# Patient Record
Sex: MALE | Race: WHITE | NOT HISPANIC OR LATINO | Employment: UNEMPLOYED | URBAN - METROPOLITAN AREA
[De-identification: names, ages, dates, MRNs, and addresses within clinical notes are randomized per-mention and may not be internally consistent; named-entity substitution may affect disease eponyms.]

---

## 2019-01-02 ENCOUNTER — TELEPHONE (OUTPATIENT)
Dept: GASTROENTEROLOGY | Facility: CLINIC | Age: 54
End: 2019-01-02

## 2019-01-02 NOTE — TELEPHONE ENCOUNTER
SPOKE WITH WIFE.  PATIENT HAS POSITIVE COLOGUARD.  TRYING TO FIND DOCTOR THAT IS PAR WITH HER INSURANCE NELLIE MANAGED CHOICE.  SHE GOING TO CALL HIS PCP FOR REFERRAL AND TO FAX OVER THE COLOGUARD TEST.  GAVE HER THE FAX# AND DIRECT LINE TO ME DESK.  SHE DID FILL PUT PAPER WORK FROM DR. ARMSTRONG OFFICE, BUT HE IS NOT PAR WITH HER INSURANCE.  EXPLAINED ONCE RECEIVED COLOGUARD RESULTS, WILL FORWARD TO DR. COVINGTON FOR REVIEW.

## 2019-02-21 ENCOUNTER — ON CAMPUS - OUTPATIENT (OUTPATIENT)
Dept: URBAN - METROPOLITAN AREA HOSPITAL 108 | Facility: HOSPITAL | Age: 54
End: 2019-02-21
Payer: COMMERCIAL

## 2019-02-21 DIAGNOSIS — R19.5 OTHER FECAL ABNORMALITIES: ICD-10-CM

## 2019-02-21 DIAGNOSIS — K92.2 GASTROINTESTINAL HEMORRHAGE, UNSPECIFIED: ICD-10-CM

## 2019-02-21 DIAGNOSIS — K63.3 ULCER OF INTESTINE: ICD-10-CM

## 2019-02-21 DIAGNOSIS — C18.7 MALIGNANT NEOPLASM OF SIGMOID COLON: ICD-10-CM

## 2019-02-21 DIAGNOSIS — K57.30 DIVERTICULOSIS OF LARGE INTESTINE WITHOUT PERFORATION OR ABS: ICD-10-CM

## 2019-02-21 DIAGNOSIS — Z12.11 ENCOUNTER FOR SCREENING FOR MALIGNANT NEOPLASM OF COLON: ICD-10-CM

## 2019-02-21 PROCEDURE — 45385 COLONOSCOPY W/LESION REMOVAL: CPT | Mod: 33

## 2019-02-21 PROCEDURE — 45381 COLONOSCOPY SUBMUCOUS NJX: CPT | Mod: 33,59

## 2022-12-07 ENCOUNTER — TRANSCRIBE ORDERS (OUTPATIENT)
Dept: ADMINISTRATIVE | Facility: HOSPITAL | Age: 57
End: 2022-12-07

## 2022-12-07 DIAGNOSIS — R06.02 SHORTNESS OF BREATH: Primary | ICD-10-CM

## 2023-01-31 ENCOUNTER — HOSPITAL ENCOUNTER (OUTPATIENT)
Dept: GENERAL RADIOLOGY | Facility: HOSPITAL | Age: 58
Discharge: HOME | End: 2023-01-31
Payer: COMMERCIAL

## 2023-01-31 ENCOUNTER — PRE-ADMISSION TESTING (OUTPATIENT)
Dept: PREADMISSION TESTING | Facility: HOSPITAL | Age: 58
End: 2023-01-31
Payer: COMMERCIAL

## 2023-01-31 VITALS
OXYGEN SATURATION: 97 % | WEIGHT: 315 LBS | HEIGHT: 72 IN | BODY MASS INDEX: 42.66 KG/M2 | HEART RATE: 69 BPM | TEMPERATURE: 97 F | SYSTOLIC BLOOD PRESSURE: 125 MMHG | RESPIRATION RATE: 16 BRPM | DIASTOLIC BLOOD PRESSURE: 86 MMHG

## 2023-01-31 LAB
ALBUMIN SERPL-MCNC: 4 G/DL (ref 3.5–5.2)
ALBUMIN/GLOB SERPL: 1.1 G/DL
ALP SERPL-CCNC: 97 U/L (ref 39–117)
ALT SERPL W P-5'-P-CCNC: 49 U/L (ref 1–41)
ANION GAP SERPL CALCULATED.3IONS-SCNC: 6.3 MMOL/L (ref 5–15)
AST SERPL-CCNC: 27 U/L (ref 1–40)
BILIRUB SERPL-MCNC: 0.7 MG/DL (ref 0–1.2)
BUN SERPL-MCNC: 17 MG/DL (ref 6–20)
BUN/CREAT SERPL: 19.8 (ref 7–25)
CALCIUM SPEC-SCNC: 9.4 MG/DL (ref 8.6–10.5)
CHLORIDE SERPL-SCNC: 102 MMOL/L (ref 98–107)
CO2 SERPL-SCNC: 27.7 MMOL/L (ref 22–29)
CREAT SERPL-MCNC: 0.86 MG/DL (ref 0.76–1.27)
DEPRECATED RDW RBC AUTO: 40 FL (ref 37–54)
EGFRCR SERPLBLD CKD-EPI 2021: 101.6 ML/MIN/1.73
ERYTHROCYTE [DISTWIDTH] IN BLOOD BY AUTOMATED COUNT: 13.9 % (ref 12.3–15.4)
GLOBULIN UR ELPH-MCNC: 3.6 GM/DL
GLUCOSE SERPL-MCNC: 93 MG/DL (ref 65–99)
HBA1C MFR BLD: 5 % (ref 4.8–5.6)
HCT VFR BLD AUTO: 44.6 % (ref 37.5–51)
HGB BLD-MCNC: 14.2 G/DL (ref 13–17.7)
INR PPP: 1 (ref 0.9–1.1)
MCH RBC QN AUTO: 25.6 PG (ref 26.6–33)
MCHC RBC AUTO-ENTMCNC: 31.8 G/DL (ref 31.5–35.7)
MCV RBC AUTO: 80.4 FL (ref 79–97)
PLATELET # BLD AUTO: 258 10*3/MM3 (ref 140–450)
PMV BLD AUTO: 9.3 FL (ref 6–12)
POTASSIUM SERPL-SCNC: 4.2 MMOL/L (ref 3.5–5.2)
PROT SERPL-MCNC: 7.6 G/DL (ref 6–8.5)
PROTHROMBIN TIME: 13.3 SECONDS (ref 11.7–14.2)
QT INTERVAL: 407 MS
RBC # BLD AUTO: 5.55 10*6/MM3 (ref 4.14–5.8)
SODIUM SERPL-SCNC: 136 MMOL/L (ref 136–145)
WBC NRBC COR # BLD: 9.15 10*3/MM3 (ref 3.4–10.8)

## 2023-01-31 PROCEDURE — 83036 HEMOGLOBIN GLYCOSYLATED A1C: CPT

## 2023-01-31 PROCEDURE — 85027 COMPLETE CBC AUTOMATED: CPT

## 2023-01-31 PROCEDURE — 71046 X-RAY EXAM CHEST 2 VIEWS: CPT

## 2023-01-31 PROCEDURE — 93010 ELECTROCARDIOGRAM REPORT: CPT | Performed by: INTERNAL MEDICINE

## 2023-01-31 PROCEDURE — 36415 COLL VENOUS BLD VENIPUNCTURE: CPT

## 2023-01-31 PROCEDURE — 85610 PROTHROMBIN TIME: CPT

## 2023-01-31 PROCEDURE — 93005 ELECTROCARDIOGRAM TRACING: CPT

## 2023-01-31 PROCEDURE — 80053 COMPREHEN METABOLIC PANEL: CPT

## 2023-01-31 PROCEDURE — 73560 X-RAY EXAM OF KNEE 1 OR 2: CPT

## 2023-01-31 RX ORDER — NAPROXEN SODIUM 220 MG
220 TABLET ORAL 2 TIMES DAILY PRN
COMMUNITY
End: 2023-02-15 | Stop reason: HOSPADM

## 2023-01-31 RX ORDER — ESCITALOPRAM OXALATE 10 MG/1
10 TABLET ORAL NIGHTLY
COMMUNITY
Start: 2022-09-28

## 2023-02-14 ENCOUNTER — HOSPITAL ENCOUNTER (OUTPATIENT)
Facility: HOSPITAL | Age: 58
Discharge: HOME | End: 2023-02-15
Attending: ORTHOPAEDIC SURGERY | Admitting: ORTHOPAEDIC SURGERY
Payer: COMMERCIAL

## 2023-02-14 ENCOUNTER — APPOINTMENT (OUTPATIENT)
Dept: GENERAL RADIOLOGY | Facility: HOSPITAL | Age: 58
End: 2023-02-14
Payer: COMMERCIAL

## 2023-02-14 ENCOUNTER — ANESTHESIA (OUTPATIENT)
Dept: PERIOP | Facility: HOSPITAL | Age: 58
End: 2023-02-14
Payer: COMMERCIAL

## 2023-02-14 ENCOUNTER — ANESTHESIA EVENT (OUTPATIENT)
Dept: PERIOP | Facility: HOSPITAL | Age: 58
End: 2023-02-14
Payer: COMMERCIAL

## 2023-02-14 DIAGNOSIS — M17.12 PRIMARY OSTEOARTHRITIS OF LEFT KNEE: Primary | ICD-10-CM

## 2023-02-14 PROCEDURE — G0378 HOSPITAL OBSERVATION PER HR: HCPCS

## 2023-02-14 PROCEDURE — 25010000002 MAGNESIUM SULFATE PER 500 MG OF MAGNESIUM: Performed by: NURSE ANESTHETIST, CERTIFIED REGISTERED

## 2023-02-14 PROCEDURE — C1776 JOINT DEVICE (IMPLANTABLE): HCPCS | Performed by: ORTHOPAEDIC SURGERY

## 2023-02-14 PROCEDURE — 25010000002 PROPOFOL 10 MG/ML EMULSION: Performed by: NURSE ANESTHETIST, CERTIFIED REGISTERED

## 2023-02-14 PROCEDURE — 25010000002 ROPIVACAINE PER 1 MG: Performed by: ANESTHESIOLOGY

## 2023-02-14 PROCEDURE — 25010000002 KETOROLAC TROMETHAMINE PER 15 MG: Performed by: ORTHOPAEDIC SURGERY

## 2023-02-14 PROCEDURE — 25010000002 CEFAZOLIN PER 500 MG: Performed by: ORTHOPAEDIC SURGERY

## 2023-02-14 PROCEDURE — 25010000002 NEOSTIGMINE 5 MG/10ML SOLUTION: Performed by: NURSE ANESTHETIST, CERTIFIED REGISTERED

## 2023-02-14 PROCEDURE — C1713 ANCHOR/SCREW BN/BN,TIS/BN: HCPCS | Performed by: ORTHOPAEDIC SURGERY

## 2023-02-14 PROCEDURE — 25010000002 EPINEPHRINE 1 MG/ML SOLUTION 30 ML VIAL: Performed by: ORTHOPAEDIC SURGERY

## 2023-02-14 PROCEDURE — 25010000002 MIDAZOLAM PER 1 MG: Performed by: ANESTHESIOLOGY

## 2023-02-14 PROCEDURE — 25010000002 FENTANYL CITRATE (PF) 50 MCG/ML SOLUTION: Performed by: NURSE ANESTHETIST, CERTIFIED REGISTERED

## 2023-02-14 PROCEDURE — 25010000002 HYDROMORPHONE 1 MG/ML SOLUTION: Performed by: NURSE ANESTHETIST, CERTIFIED REGISTERED

## 2023-02-14 PROCEDURE — 25010000002 ROPIVACAINE PER 1 MG: Performed by: ORTHOPAEDIC SURGERY

## 2023-02-14 PROCEDURE — 25010000002 FENTANYL CITRATE (PF) 100 MCG/2ML SOLUTION: Performed by: NURSE ANESTHETIST, CERTIFIED REGISTERED

## 2023-02-14 PROCEDURE — 25010000002 DEXAMETHASONE SODIUM PHOSPHATE 20 MG/5ML SOLUTION: Performed by: NURSE ANESTHETIST, CERTIFIED REGISTERED

## 2023-02-14 PROCEDURE — 25010000002 FENTANYL CITRATE (PF) 50 MCG/ML SOLUTION: Performed by: ANESTHESIOLOGY

## 2023-02-14 PROCEDURE — 73560 X-RAY EXAM OF KNEE 1 OR 2: CPT

## 2023-02-14 PROCEDURE — 25010000002 ONDANSETRON PER 1 MG: Performed by: NURSE ANESTHETIST, CERTIFIED REGISTERED

## 2023-02-14 DEVICE — COMP FEM/KN VANGUARD INTLK CR 75MM NS LT: Type: IMPLANTABLE DEVICE | Site: KNEE | Status: FUNCTIONAL

## 2023-02-14 DEVICE — CMT BONE R 1X40: Type: IMPLANTABLE DEVICE | Site: KNEE | Status: FUNCTIONAL

## 2023-02-14 DEVICE — IMPLANTABLE DEVICE: Type: IMPLANTABLE DEVICE | Site: KNEE | Status: FUNCTIONAL

## 2023-02-14 DEVICE — CAP TOTL KN CMT PRIMARY: Type: IMPLANTABLE DEVICE | Status: FUNCTIONAL

## 2023-02-14 DEVICE — TRY TIB INTERLOK PRI 83MM: Type: IMPLANTABLE DEVICE | Site: KNEE | Status: FUNCTIONAL

## 2023-02-14 DEVICE — PAT 3PEG THN 37X9.6 37MM: Type: IMPLANTABLE DEVICE | Site: KNEE | Status: FUNCTIONAL

## 2023-02-14 DEVICE — STEM TIB PRI FINN 46X40MM: Type: IMPLANTABLE DEVICE | Site: KNEE | Status: FUNCTIONAL

## 2023-02-14 RX ORDER — MIDAZOLAM HYDROCHLORIDE 1 MG/ML
2 INJECTION INTRAMUSCULAR; INTRAVENOUS
Status: DISCONTINUED | OUTPATIENT
Start: 2023-02-14 | End: 2023-02-14 | Stop reason: HOSPADM

## 2023-02-14 RX ORDER — DIPHENHYDRAMINE HYDROCHLORIDE 50 MG/ML
12.5 INJECTION INTRAMUSCULAR; INTRAVENOUS
Status: DISCONTINUED | OUTPATIENT
Start: 2023-02-14 | End: 2023-02-14 | Stop reason: HOSPADM

## 2023-02-14 RX ORDER — HYDROCODONE BITARTRATE AND ACETAMINOPHEN 10; 325 MG/1; MG/1
1 TABLET ORAL EVERY 4 HOURS PRN
Status: DISCONTINUED | OUTPATIENT
Start: 2023-02-14 | End: 2023-02-15 | Stop reason: HOSPADM

## 2023-02-14 RX ORDER — DIPHENHYDRAMINE HCL 25 MG
25 CAPSULE ORAL
Status: DISCONTINUED | OUTPATIENT
Start: 2023-02-14 | End: 2023-02-14 | Stop reason: HOSPADM

## 2023-02-14 RX ORDER — SODIUM CHLORIDE 450 MG/100ML
100 INJECTION, SOLUTION INTRAVENOUS CONTINUOUS
Status: DISCONTINUED | OUTPATIENT
Start: 2023-02-14 | End: 2023-02-15 | Stop reason: HOSPADM

## 2023-02-14 RX ORDER — PROPOFOL 10 MG/ML
VIAL (ML) INTRAVENOUS AS NEEDED
Status: DISCONTINUED | OUTPATIENT
Start: 2023-02-14 | End: 2023-02-14 | Stop reason: SURG

## 2023-02-14 RX ORDER — SODIUM CHLORIDE 0.9 % (FLUSH) 0.9 %
10 SYRINGE (ML) INJECTION EVERY 12 HOURS SCHEDULED
Status: DISCONTINUED | OUTPATIENT
Start: 2023-02-14 | End: 2023-02-15 | Stop reason: HOSPADM

## 2023-02-14 RX ORDER — HYDRALAZINE HYDROCHLORIDE 20 MG/ML
5 INJECTION INTRAMUSCULAR; INTRAVENOUS
Status: DISCONTINUED | OUTPATIENT
Start: 2023-02-14 | End: 2023-02-14 | Stop reason: HOSPADM

## 2023-02-14 RX ORDER — ACETAMINOPHEN 500 MG
1000 TABLET ORAL ONCE
Status: COMPLETED | OUTPATIENT
Start: 2023-02-14 | End: 2023-02-14

## 2023-02-14 RX ORDER — SODIUM CHLORIDE, SODIUM LACTATE, POTASSIUM CHLORIDE, CALCIUM CHLORIDE 600; 310; 30; 20 MG/100ML; MG/100ML; MG/100ML; MG/100ML
9 INJECTION, SOLUTION INTRAVENOUS CONTINUOUS PRN
Status: DISCONTINUED | OUTPATIENT
Start: 2023-02-14 | End: 2023-02-15 | Stop reason: HOSPADM

## 2023-02-14 RX ORDER — CELECOXIB 200 MG/1
200 CAPSULE ORAL ONCE
Status: COMPLETED | OUTPATIENT
Start: 2023-02-14 | End: 2023-02-14

## 2023-02-14 RX ORDER — TRANEXAMIC ACID 100 MG/ML
INJECTION, SOLUTION INTRAVENOUS AS NEEDED
Status: DISCONTINUED | OUTPATIENT
Start: 2023-02-14 | End: 2023-02-14 | Stop reason: SURG

## 2023-02-14 RX ORDER — DIAZEPAM 5 MG/1
5 TABLET ORAL 2 TIMES DAILY PRN
Status: DISCONTINUED | OUTPATIENT
Start: 2023-02-14 | End: 2023-02-15 | Stop reason: HOSPADM

## 2023-02-14 RX ORDER — DOCUSATE SODIUM 100 MG/1
100 CAPSULE, LIQUID FILLED ORAL 2 TIMES DAILY PRN
Status: DISCONTINUED | OUTPATIENT
Start: 2023-02-14 | End: 2023-02-15 | Stop reason: HOSPADM

## 2023-02-14 RX ORDER — FAMOTIDINE 10 MG/ML
20 INJECTION, SOLUTION INTRAVENOUS ONCE
Status: COMPLETED | OUTPATIENT
Start: 2023-02-14 | End: 2023-02-14

## 2023-02-14 RX ORDER — KETAMINE HCL IN NACL, ISO-OSM 100MG/10ML
SYRINGE (ML) INJECTION AS NEEDED
Status: DISCONTINUED | OUTPATIENT
Start: 2023-02-14 | End: 2023-02-14 | Stop reason: SURG

## 2023-02-14 RX ORDER — MORPHINE SULFATE 2 MG/ML
4 INJECTION, SOLUTION INTRAMUSCULAR; INTRAVENOUS
Status: DISCONTINUED | OUTPATIENT
Start: 2023-02-14 | End: 2023-02-15 | Stop reason: HOSPADM

## 2023-02-14 RX ORDER — FENTANYL CITRATE 50 UG/ML
INJECTION, SOLUTION INTRAMUSCULAR; INTRAVENOUS AS NEEDED
Status: DISCONTINUED | OUTPATIENT
Start: 2023-02-14 | End: 2023-02-14 | Stop reason: SURG

## 2023-02-14 RX ORDER — ONDANSETRON 2 MG/ML
INJECTION INTRAMUSCULAR; INTRAVENOUS AS NEEDED
Status: DISCONTINUED | OUTPATIENT
Start: 2023-02-14 | End: 2023-02-14 | Stop reason: SURG

## 2023-02-14 RX ORDER — KETOROLAC TROMETHAMINE 30 MG/ML
30 INJECTION, SOLUTION INTRAMUSCULAR; INTRAVENOUS EVERY 6 HOURS PRN
Status: DISCONTINUED | OUTPATIENT
Start: 2023-02-14 | End: 2023-02-15 | Stop reason: HOSPADM

## 2023-02-14 RX ORDER — ONDANSETRON 2 MG/ML
4 INJECTION INTRAMUSCULAR; INTRAVENOUS EVERY 6 HOURS PRN
Status: DISCONTINUED | OUTPATIENT
Start: 2023-02-14 | End: 2023-02-15 | Stop reason: HOSPADM

## 2023-02-14 RX ORDER — FERROUS SULFATE 325(65) MG
325 TABLET ORAL
Status: DISCONTINUED | OUTPATIENT
Start: 2023-02-15 | End: 2023-02-15 | Stop reason: HOSPADM

## 2023-02-14 RX ORDER — HYDROMORPHONE HYDROCHLORIDE 1 MG/ML
0.5 INJECTION, SOLUTION INTRAMUSCULAR; INTRAVENOUS; SUBCUTANEOUS
Status: DISCONTINUED | OUTPATIENT
Start: 2023-02-14 | End: 2023-02-14 | Stop reason: HOSPADM

## 2023-02-14 RX ORDER — ASPIRIN 325 MG
325 TABLET, DELAYED RELEASE (ENTERIC COATED) ORAL 2 TIMES DAILY WITH MEALS
Status: DISCONTINUED | OUTPATIENT
Start: 2023-02-15 | End: 2023-02-15 | Stop reason: HOSPADM

## 2023-02-14 RX ORDER — DIPHENHYDRAMINE HCL 25 MG
25 CAPSULE ORAL EVERY 6 HOURS PRN
Status: DISCONTINUED | OUTPATIENT
Start: 2023-02-14 | End: 2023-02-15 | Stop reason: HOSPADM

## 2023-02-14 RX ORDER — HYDROCODONE BITARTRATE AND ACETAMINOPHEN 7.5; 325 MG/1; MG/1
1 TABLET ORAL ONCE AS NEEDED
Status: COMPLETED | OUTPATIENT
Start: 2023-02-14 | End: 2023-02-14

## 2023-02-14 RX ORDER — CLINDAMYCIN PHOSPHATE 900 MG/50ML
900 INJECTION INTRAVENOUS EVERY 8 HOURS
Status: COMPLETED | OUTPATIENT
Start: 2023-02-14 | End: 2023-02-15

## 2023-02-14 RX ORDER — ESCITALOPRAM OXALATE 10 MG/1
10 TABLET ORAL NIGHTLY
Status: DISCONTINUED | OUTPATIENT
Start: 2023-02-14 | End: 2023-02-15 | Stop reason: HOSPADM

## 2023-02-14 RX ORDER — DIPHENHYDRAMINE HYDROCHLORIDE 50 MG/ML
12.5 INJECTION INTRAMUSCULAR; INTRAVENOUS EVERY 6 HOURS PRN
Status: DISCONTINUED | OUTPATIENT
Start: 2023-02-14 | End: 2023-02-15 | Stop reason: HOSPADM

## 2023-02-14 RX ORDER — PROMETHAZINE HYDROCHLORIDE 25 MG/1
25 TABLET ORAL ONCE AS NEEDED
Status: DISCONTINUED | OUTPATIENT
Start: 2023-02-14 | End: 2023-02-14 | Stop reason: HOSPADM

## 2023-02-14 RX ORDER — HYDROCODONE BITARTRATE AND ACETAMINOPHEN 10; 325 MG/1; MG/1
1-2 TABLET ORAL EVERY 4 HOURS PRN
Qty: 42 TABLET | Refills: 0 | Status: SHIPPED | OUTPATIENT
Start: 2023-02-14

## 2023-02-14 RX ORDER — PROMETHAZINE HYDROCHLORIDE 12.5 MG/1
12.5 TABLET ORAL EVERY 6 HOURS PRN
Status: DISCONTINUED | OUTPATIENT
Start: 2023-02-14 | End: 2023-02-15 | Stop reason: HOSPADM

## 2023-02-14 RX ORDER — SODIUM CHLORIDE 0.9 % (FLUSH) 0.9 %
1-10 SYRINGE (ML) INJECTION AS NEEDED
Status: DISCONTINUED | OUTPATIENT
Start: 2023-02-14 | End: 2023-02-15 | Stop reason: HOSPADM

## 2023-02-14 RX ORDER — ASPIRIN 325 MG
325 TABLET, DELAYED RELEASE (ENTERIC COATED) ORAL 2 TIMES DAILY WITH MEALS
Qty: 60 TABLET | Refills: 0 | Status: SHIPPED | OUTPATIENT
Start: 2023-02-15

## 2023-02-14 RX ORDER — ONDANSETRON 4 MG/1
4 TABLET, FILM COATED ORAL EVERY 6 HOURS PRN
Status: DISCONTINUED | OUTPATIENT
Start: 2023-02-14 | End: 2023-02-15 | Stop reason: HOSPADM

## 2023-02-14 RX ORDER — NALOXONE HCL 0.4 MG/ML
0.4 VIAL (ML) INJECTION
Status: DISCONTINUED | OUTPATIENT
Start: 2023-02-14 | End: 2023-02-15 | Stop reason: HOSPADM

## 2023-02-14 RX ORDER — SODIUM CHLORIDE 0.9 % (FLUSH) 0.9 %
3 SYRINGE (ML) INJECTION EVERY 12 HOURS SCHEDULED
Status: DISCONTINUED | OUTPATIENT
Start: 2023-02-14 | End: 2023-02-14 | Stop reason: HOSPADM

## 2023-02-14 RX ORDER — ROCURONIUM BROMIDE 10 MG/ML
INJECTION, SOLUTION INTRAVENOUS AS NEEDED
Status: DISCONTINUED | OUTPATIENT
Start: 2023-02-14 | End: 2023-02-14 | Stop reason: SURG

## 2023-02-14 RX ORDER — MAGNESIUM SULFATE HEPTAHYDRATE 500 MG/ML
INJECTION, SOLUTION INTRAMUSCULAR; INTRAVENOUS AS NEEDED
Status: DISCONTINUED | OUTPATIENT
Start: 2023-02-14 | End: 2023-02-14 | Stop reason: SURG

## 2023-02-14 RX ORDER — SODIUM CHLORIDE 0.9 % (FLUSH) 0.9 %
3-10 SYRINGE (ML) INJECTION AS NEEDED
Status: DISCONTINUED | OUTPATIENT
Start: 2023-02-14 | End: 2023-02-14 | Stop reason: HOSPADM

## 2023-02-14 RX ORDER — DEXAMETHASONE SODIUM PHOSPHATE 4 MG/ML
INJECTION, SOLUTION INTRA-ARTICULAR; INTRALESIONAL; INTRAMUSCULAR; INTRAVENOUS; SOFT TISSUE AS NEEDED
Status: DISCONTINUED | OUTPATIENT
Start: 2023-02-14 | End: 2023-02-14 | Stop reason: SURG

## 2023-02-14 RX ORDER — ROPIVACAINE HYDROCHLORIDE 5 MG/ML
INJECTION, SOLUTION EPIDURAL; INFILTRATION; PERINEURAL
Status: COMPLETED | OUTPATIENT
Start: 2023-02-14 | End: 2023-02-14

## 2023-02-14 RX ORDER — SODIUM CHLORIDE 9 MG/ML
40 INJECTION, SOLUTION INTRAVENOUS AS NEEDED
Status: DISCONTINUED | OUTPATIENT
Start: 2023-02-14 | End: 2023-02-15 | Stop reason: HOSPADM

## 2023-02-14 RX ORDER — CEFAZOLIN SODIUM IN 0.9 % NACL 3 G/100 ML
3 INTRAVENOUS SOLUTION, PIGGYBACK (ML) INTRAVENOUS ONCE
Status: COMPLETED | OUTPATIENT
Start: 2023-02-14 | End: 2023-02-14

## 2023-02-14 RX ORDER — FENTANYL CITRATE 50 UG/ML
50 INJECTION, SOLUTION INTRAMUSCULAR; INTRAVENOUS
Status: DISCONTINUED | OUTPATIENT
Start: 2023-02-14 | End: 2023-02-14 | Stop reason: HOSPADM

## 2023-02-14 RX ORDER — PROMETHAZINE HYDROCHLORIDE 25 MG/1
25 SUPPOSITORY RECTAL ONCE AS NEEDED
Status: DISCONTINUED | OUTPATIENT
Start: 2023-02-14 | End: 2023-02-14 | Stop reason: HOSPADM

## 2023-02-14 RX ORDER — ONDANSETRON 2 MG/ML
4 INJECTION INTRAMUSCULAR; INTRAVENOUS ONCE AS NEEDED
Status: DISCONTINUED | OUTPATIENT
Start: 2023-02-14 | End: 2023-02-14 | Stop reason: HOSPADM

## 2023-02-14 RX ORDER — DOCUSATE SODIUM 250 MG
250 CAPSULE ORAL 2 TIMES DAILY PRN
Qty: 30 CAPSULE | Refills: 1 | Status: SHIPPED | OUTPATIENT
Start: 2023-02-14

## 2023-02-14 RX ORDER — OXYCODONE AND ACETAMINOPHEN 7.5; 325 MG/1; MG/1
1 TABLET ORAL EVERY 4 HOURS PRN
Status: DISCONTINUED | OUTPATIENT
Start: 2023-02-14 | End: 2023-02-14 | Stop reason: HOSPADM

## 2023-02-14 RX ORDER — CHOLECALCIFEROL (VITAMIN D3) 125 MCG
5 CAPSULE ORAL NIGHTLY PRN
Status: DISCONTINUED | OUTPATIENT
Start: 2023-02-14 | End: 2023-02-15 | Stop reason: HOSPADM

## 2023-02-14 RX ORDER — EPHEDRINE SULFATE 50 MG/ML
5 INJECTION, SOLUTION INTRAVENOUS ONCE AS NEEDED
Status: DISCONTINUED | OUTPATIENT
Start: 2023-02-14 | End: 2023-02-14 | Stop reason: HOSPADM

## 2023-02-14 RX ORDER — FLUMAZENIL 0.1 MG/ML
0.2 INJECTION INTRAVENOUS AS NEEDED
Status: DISCONTINUED | OUTPATIENT
Start: 2023-02-14 | End: 2023-02-14 | Stop reason: HOSPADM

## 2023-02-14 RX ORDER — GLYCOPYRROLATE 0.2 MG/ML
INJECTION INTRAMUSCULAR; INTRAVENOUS AS NEEDED
Status: DISCONTINUED | OUTPATIENT
Start: 2023-02-14 | End: 2023-02-14 | Stop reason: SURG

## 2023-02-14 RX ORDER — LABETALOL HYDROCHLORIDE 5 MG/ML
5 INJECTION, SOLUTION INTRAVENOUS
Status: DISCONTINUED | OUTPATIENT
Start: 2023-02-14 | End: 2023-02-14 | Stop reason: HOSPADM

## 2023-02-14 RX ORDER — ACETAMINOPHEN 325 MG/1
325 TABLET ORAL EVERY 4 HOURS PRN
Status: DISCONTINUED | OUTPATIENT
Start: 2023-02-14 | End: 2023-02-15 | Stop reason: HOSPADM

## 2023-02-14 RX ORDER — NEOSTIGMINE METHYLSULFATE 0.5 MG/ML
INJECTION, SOLUTION INTRAVENOUS AS NEEDED
Status: DISCONTINUED | OUTPATIENT
Start: 2023-02-14 | End: 2023-02-14 | Stop reason: SURG

## 2023-02-14 RX ORDER — LIDOCAINE HYDROCHLORIDE 20 MG/ML
INJECTION, SOLUTION INFILTRATION; PERINEURAL AS NEEDED
Status: DISCONTINUED | OUTPATIENT
Start: 2023-02-14 | End: 2023-02-14 | Stop reason: SURG

## 2023-02-14 RX ORDER — NALOXONE HCL 0.4 MG/ML
0.2 VIAL (ML) INJECTION AS NEEDED
Status: DISCONTINUED | OUTPATIENT
Start: 2023-02-14 | End: 2023-02-14 | Stop reason: HOSPADM

## 2023-02-14 RX ADMIN — GLYCOPYRROLATE 0.4 MCG: 1 INJECTION INTRAMUSCULAR; INTRAVENOUS at 14:03

## 2023-02-14 RX ADMIN — CELECOXIB 200 MG: 200 CAPSULE ORAL at 09:59

## 2023-02-14 RX ADMIN — SODIUM CHLORIDE, POTASSIUM CHLORIDE, SODIUM LACTATE AND CALCIUM CHLORIDE: 600; 310; 30; 20 INJECTION, SOLUTION INTRAVENOUS at 14:03

## 2023-02-14 RX ADMIN — FENTANYL CITRATE 50 MCG: 50 INJECTION, SOLUTION INTRAMUSCULAR; INTRAVENOUS at 14:29

## 2023-02-14 RX ADMIN — HYDROCODONE BITARTRATE AND ACETAMINOPHEN 1 TABLET: 7.5; 325 TABLET ORAL at 14:49

## 2023-02-14 RX ADMIN — ESCITALOPRAM 10 MG: 10 TABLET, FILM COATED ORAL at 20:40

## 2023-02-14 RX ADMIN — CLINDAMYCIN PHOSPHATE 900 MG: 900 INJECTION, SOLUTION INTRAVENOUS at 21:48

## 2023-02-14 RX ADMIN — KETOROLAC TROMETHAMINE 30 MG: 30 INJECTION, SOLUTION INTRAMUSCULAR; INTRAVENOUS at 17:41

## 2023-02-14 RX ADMIN — CEFAZOLIN 3 G: 10 INJECTION, POWDER, FOR SOLUTION INTRAVENOUS at 11:57

## 2023-02-14 RX ADMIN — FENTANYL CITRATE 50 MCG: 50 INJECTION, SOLUTION INTRAMUSCULAR; INTRAVENOUS at 14:53

## 2023-02-14 RX ADMIN — ROPIVACAINE HYDROCHLORIDE 20 ML: 5 INJECTION, SOLUTION EPIDURAL; INFILTRATION; PERINEURAL at 11:06

## 2023-02-14 RX ADMIN — FENTANYL CITRATE 100 MCG: 50 INJECTION, SOLUTION INTRAMUSCULAR; INTRAVENOUS at 12:12

## 2023-02-14 RX ADMIN — ACETAMINOPHEN 1000 MG: 500 TABLET, FILM COATED ORAL at 09:59

## 2023-02-14 RX ADMIN — PROPOFOL 270 MG: 10 INJECTION, EMULSION INTRAVENOUS at 12:12

## 2023-02-14 RX ADMIN — CEFAZOLIN 2 G: 10 INJECTION, POWDER, FOR SOLUTION INTRAVENOUS at 13:50

## 2023-02-14 RX ADMIN — LIDOCAINE HYDROCHLORIDE 100 MG: 20 INJECTION, SOLUTION INFILTRATION; PERINEURAL at 12:12

## 2023-02-14 RX ADMIN — SODIUM CHLORIDE 100 ML/HR: 4.5 INJECTION, SOLUTION INTRAVENOUS at 17:43

## 2023-02-14 RX ADMIN — SODIUM CHLORIDE, POTASSIUM CHLORIDE, SODIUM LACTATE AND CALCIUM CHLORIDE 9 ML/HR: 600; 310; 30; 20 INJECTION, SOLUTION INTRAVENOUS at 10:30

## 2023-02-14 RX ADMIN — NEOSTIGMINE METHYLSULFATE 3 MG: 0.5 INJECTION INTRAVENOUS at 14:03

## 2023-02-14 RX ADMIN — HYDROMORPHONE HYDROCHLORIDE 0.5 MG: 1 INJECTION, SOLUTION INTRAMUSCULAR; INTRAVENOUS; SUBCUTANEOUS at 14:02

## 2023-02-14 RX ADMIN — Medication 20 MG: at 12:39

## 2023-02-14 RX ADMIN — HYDROMORPHONE HYDROCHLORIDE 0.5 MG: 1 INJECTION, SOLUTION INTRAMUSCULAR; INTRAVENOUS; SUBCUTANEOUS at 13:26

## 2023-02-14 RX ADMIN — FENTANYL CITRATE 50 MCG: 50 INJECTION, SOLUTION INTRAMUSCULAR; INTRAVENOUS at 10:44

## 2023-02-14 RX ADMIN — Medication 30 MG: at 12:22

## 2023-02-14 RX ADMIN — MIDAZOLAM 2 MG: 1 INJECTION INTRAMUSCULAR; INTRAVENOUS at 10:43

## 2023-02-14 RX ADMIN — ONDANSETRON 4 MG: 2 INJECTION INTRAMUSCULAR; INTRAVENOUS at 14:03

## 2023-02-14 RX ADMIN — TRANEXAMIC ACID 1000 MG: 1 INJECTION, SOLUTION INTRAVENOUS at 12:27

## 2023-02-14 RX ADMIN — MAGNESIUM SULFATE HEPTAHYDRATE 1 G: 500 INJECTION, SOLUTION INTRAMUSCULAR; INTRAVENOUS at 12:22

## 2023-02-14 RX ADMIN — FAMOTIDINE 20 MG: 10 INJECTION INTRAVENOUS at 10:35

## 2023-02-14 RX ADMIN — DEXAMETHASONE SODIUM PHOSPHATE 6 MG: 4 INJECTION, SOLUTION INTRAMUSCULAR; INTRAVENOUS at 12:25

## 2023-02-14 RX ADMIN — HYDROCODONE BITARTRATE AND ACETAMINOPHEN 1 TABLET: 10; 325 TABLET ORAL at 20:11

## 2023-02-14 RX ADMIN — MUPIROCIN 1 APPLICATION: 20 OINTMENT TOPICAL at 20:40

## 2023-02-14 RX ADMIN — ROCURONIUM BROMIDE 50 MG: 50 INJECTION INTRAVENOUS at 12:12

## 2023-02-14 NOTE — ANESTHESIA PROCEDURE NOTES
Peripheral Block      Patient reassessed immediately prior to procedure    Patient location during procedure: holding area  Reason for block: at surgeon's request and post-op pain management  Performed by  Anesthesiologist: Adam Galindo MD  Preanesthetic Checklist  Completed: patient identified, IV checked, site marked, risks and benefits discussed, surgical consent, monitors and equipment checked, pre-op evaluation and timeout performed  Prep:  Sterile barriers:cap, gloves, mask and sterile barriers  Prep: ChloraPrep  Patient monitoring: blood pressure monitoring, continuous pulse oximetry and EKG  Procedure    Sedation: yes  Performed under: local infiltration  Guidance:ultrasound guided    ULTRASOUND INTERPRETATION.  Using ultrasound guidance a 21 G gauge needle was placed in close proximity to the nerve, at which point, under ultrasound guidance anesthetic was injected in the area of the nerve and spread of the anesthesia was seen on ultrasound in close proximity thereto.  There were no abnormalities seen on ultrasound; a digital image was taken; and the patient tolerated the procedure with no complications. Images:still images obtained    Laterality:left  Block Type:adductor canal block  Injection Technique:single-shot  Needle Type:echogenic  Needle Gauge:21 G  Resistance on Injection: none    Medications Used: ropivacaine (NAROPIN) 0.5 % injection - Injection   20 mL - 2/14/2023 11:06:00 AM      Post Assessment  Injection Assessment: negative aspiration for heme, no paresthesia on injection and incremental injection  Patient Tolerance:comfortable throughout block  Complications:no  Additional Notes  Ultrasound interpretation note:  Under ultrasound guidance, needle seen near nerves, local seen spreading around.  No abnormalities noted.  Block for postop pain per surgeon request.

## 2023-02-14 NOTE — ANESTHESIA POSTPROCEDURE EVALUATION
Patient: Ant Massey    Procedure Summary     Date: 02/14/23 Room / Location:  [facility] [address] [place] [address] [facility]    Anesthesia Start: 1205 Anesthesia Stop: 1426    Procedure: LEFT TOTAL KNEE ARTHROPLASTY (Left: Knee) Diagnosis:     Surgeons: Nhan Beard MD Provider: Srikanth Jimenez MD    Anesthesia Type: general ASA Status: 3          Anesthesia Type: general    Vitals  Vitals Value Taken Time   /82 02/14/23 1545   Temp 36.9 °C (98.5 °F) 02/14/23 1422   Pulse 72 02/14/23 1551   Resp 18 02/14/23 1545   SpO2 97 % 02/14/23 1551   Vitals shown include unvalidated device data.        Post Anesthesia Care and Evaluation    Patient location during evaluation: bedside  Patient participation: complete - patient participated  Level of consciousness: awake and alert  Pain management: adequate    Airway patency: patent  Anesthetic complications: No anesthetic complications    Cardiovascular status: acceptable  Respiratory status: acceptable  Hydration status: acceptable    Comments: /82   Pulse 77   Temp 36.9 °C (98.5 °F) (Oral)   Resp 18   SpO2 96%

## 2023-02-14 NOTE — H&P
Orthopaedic Surgery History and Physical    Patient Name:  Ant Massey  YOB: 1965   Age: 57 y.o.  Medical Records Number:  [id number]    Date of Admission:  2/14/2023  8:58 AM    Chief Complaint:  No admission diagnoses are documented for this encounter.    Ant Massey is a 57 y.o. male who presents c/o severe left knee pain.  The pain has been on and off for many years, worsening to the point where the pain is becoming disabling.  The pain is a constant dull ache with occasional sharp, stabbing pain.  The patient has failed conservative treatment and would like to proceed with left total knee arthroplasty.    /75 (BP Location: Left arm, Patient Position: Sitting)   Pulse 71   Temp 97.8 °F (36.6 °C) (Oral)   Resp 16   SpO2 95%     Past Medical History:    Past Medical History:   Diagnosis Date   • Anxiety    • Colon cancer (HCC)    • Hearing loss     BELINDA   • Knee pain, left    • Sleep apnea     CPAP   • Varicose vein of leg     BELINDA   • Ventral hernia        Past Surgical History:   Past Surgical History:   Procedure Laterality Date   • ACHILLES TENDON SURGERY Right 2015   • COLON RESECTION     • COLONOSCOPY     • ERCP     • GALLBLADDER SURGERY     • KNEE MENISCAL REPAIR Right        Social History:    Social History     Socioeconomic History   • Marital status:    Tobacco Use   • Smoking status: Never   Vaping Use   • Vaping Use: Never used   Substance and Sexual Activity   • Alcohol use: Not Currently     Alcohol/week: 4.0 standard drinks     Types: 4 Cans of beer per week   • Drug use: No   • Sexual activity: Defer       Family History:    Family History   Problem Relation Age of Onset   • Malig Hyperthermia Neg Hx        Current Medications:  Scheduled Meds:ceFAZolin, 3 g, Intravenous, Once  famotidine, 20 mg, Intravenous, Once  Orthopedic surgery custom cocktail, , Injection, Once  sodium chloride, 3 mL, Intravenous, Q12H      Continuous Infusions:lactated ringers, 9  mL/hr      PRN Meds:.•  fentanyl  •  lactated ringers  •  midazolam  •  sodium chloride    Current Facility-Administered Medications:   •  ceFAZolin in Sodium Chloride (ANCEF) IVPB solution 3 g, 3 g, Intravenous, Once, Nhan Beard MD  •  famotidine (PEPCID) injection 20 mg, 20 mg, Intravenous, Once, Andrew Morales MD  •  fentaNYL citrate (PF) (SUBLIMAZE) injection 50 mcg, 50 mcg, Intravenous, Q5 Min PRN, Andrew Morales MD  •  lactated ringers infusion, 9 mL/hr, Intravenous, Continuous PRN, Andrew Morales MD  •  midazolam (VERSED) injection 2 mg, 2 mg, Intravenous, Q10 Min PRN, Andrew Moraels MD  •  ropivacaine 0.5 % 50 mL, ketorolac 30 mg, EPINEPHrine 1 mg/mL 0.3 mg in sodium chloride 0.9 % 50 mL solution, , Injection, Once, Nhan Beard MD  •  sodium chloride 0.9 % flush 3 mL, 3 mL, Intravenous, Q12H, Andrew Morales MD  •  sodium chloride 0.9 % flush 3-10 mL, 3-10 mL, Intravenous, PRN, Andrew Morales MD    Allergies:    Allergies   Allergen Reactions   • Oxycodone Rash       Review of Systems:   HEENT: Patient denies any headaches, vision changes, change in hearing, or tinnitus, Patient denies any rhinorrhea,epistaxis, sinus pain, mouth or dental problems, sore throat or hoarseness, or dysphagia  Pulmonary: Patient denies any cough, congestion, SOA, or wheezing  Cardiovascular: Patient denies any chest pain, dyspnea, palpitations, weakness, intolerance of exercise, varicosities, swelling of extremities, known murmur  Gastrointestinal:  Patient denies nausea, vomiting, diarrhea, constipation, loss  of appetite, change in appetite, dysphagia, gas, heartburn, melena, change in bowel habits, use of laxatives or other drugs to alter the function of the gastrointestinal tract.  Genital/Urinary: Patient denies dysuria, change in color of urine, change in frequency of urination, pain with urgency, incontinence, retention, or nocturia.  Musculoskeletal:  Patient denies increased warmth; redness; or swelling of joints; limitation of function; deformity; crepitation: pain in a joint or an extremity, the neck, or the back, especially with movement.  Neurological: Patient denies dizziness, tremor, ataxia, difficulty in speaking, change in speech, paresthesia, loss of sensation, seizures, syncope, changes in memory.  Endocrine system: Patient denies tremors, palpitations, intolerance of heat or cold, polyuria, polydipsia, polyphagia, diaphoresis, exophthalmos, or goiter.  Psychological: Patient denies thoughts/plans or harming self or other; depression,  insomnia, night terrors, jose cruz, memory loss, disorientation.  Skin: Patient denies any bruising, rashes, discoloration, pruritus, wounds, ulcers, decubiti, changes in the hair or nails  Hematopoietic: Patient denies history of spontaneous or excessive bleeding, epistaxis, hematuria, melena, fatigue, enlarged or tender lymph nodes, pallor, history of anemia.        Physical Exam:   Awake, A&O x3, affect normal, no acute distress  Ambulating with a limp due to knee pain  Knee ROM is limited due to pain (5-115)  Strength is 4/5 in the quad, hamstring and calf  Cap refill is normal, Sensation intact    Card:  RR, HD Stable  Pulm:  Regular breathing, no S.O.A  Abd:  Soft, NT, ND    Lab Results (last 24 hours)     ** No results found for the last 24 hours. **          XR Chest PA & Lateral, XR Knee 1 or 2 View Left    Result Date: 1/31/2023  Narrative: XR CHEST PA AND LATERAL-, XR KNEE 1 OR 2 VW LEFT-  HISTORY: Male who is 57 years-old,  preoperative evaluation  TECHNIQUE: Frontal and lateral views of the chest, 2 views of the left knee  COMPARISON: None available  FINDINGS:  Chest x-ray: Heart, mediastinum and pulmonary vasculature are unremarkable. No focal pulmonary consolidation, pleural effusion, or pneumothorax. No acute osseous process.  Left knee: Moderate to prominent medial tibiofemoral joint space narrowing is  present. Moderate degenerative spurring is noted. Trace knee effusion. No acute fracture, erosion, or dislocation is identified.      Impression: As described.  This report was finalized on 2023 10:22 AM by Dr. Ed Cole M.D.      CT Abdomen & Pelvis W IV Contrast Without Oral Contrast    Result Date: 2023  Narrative: REVIEWING YOUR TEST RESULTS IN [facility] IS NOT A SUBSTITUTE FOR DISCUSSING THOSE RESULTS WITH YOUR HEALTH CARE PROVIDER. PLEASE CONTACT YOUR PROVIDER VIA [facility] TO DISCUSS ANY QUESTIONS OR CONCERNS YOU MAY HAVE REGARDING THESE TEST RESULTS.  RADIOLOGY REPORT FACILITY:  [facility] UNIT/AGE/GENDER: MOLLY  ER      AGE:57 Y          SEX:M PATIENT NAME/:  IFRAH YOLIE ZOË    1965 UNIT NUMBER:  [id number] ACCOUNT NUMBER:  [id number] ACCESSION NUMBER:  [id number] EXAMINATION: CT ABDOMEN AND PELVIS W IV CONTRAST DATE: 2023 HISTORY: Nausea/vomiting upper abdominal pain TECHNIQUE:  Multiple axial 5 mm images obtained of the abdomen and pelvis with 100 ml of nonionic intravenous contrast. Imaging was performed from the lung bases to the pubic symphysis. CT scans at this facility use dose modulation, iterative reconstruction and/or weight based dosing when appropriate to reduce radiation dose to as low as reasonably achievable. COMPARISON:  2022. FINDINGS: Anastomotic sutures noted at the sigmoid colon. There are at least 5 ventral hernia is noted of varying sizes. A ventral hernia seen on image 67 of series 2 with hernia defect approximately 4 cm shows protrusion of the transverse colon with associated mesenteric fat without obstruction. The stomach is unremarkable. There is fluid filled dilated small bowel loops. A more inferior right-sided ventral hernia at the peribronchial level shows protrusion of portions of small bowel segment without obstruction. More inferiorly at the right anterior pelvic wall image 17 shows a another  ventral hernia right of midline as seen on image 115. The hernia defect grossly measures 2.3 cm. This protrusion of small bowel that is thickened and likely represents the transition point. The distal small bowel loops show normal caliber otherwise. There is a 1.8 cm soft tissue nodule at the right mid abdomen image 91 of series 2. Additional mildly enlarged nodules at the right lower quadrant possibly reactive. Prostate gland shows small calcifications. Mild wall thickening urinary bladder nonspecific possibly due to underdistention. There is mild bibasilar atelectasis. No free intraperitoneal air is identified. Prior cholecystectomy. The liver and spleen otherwise unremarkable. Bilateral adrenal glands and the pancreas are within normal limits. The previously seen common bile duct stent has been removed. There is mild residual pneumobilia within the common bile duct. Mild mesenteric edema. Mild stranding within the mesentery. Small fat-containing left inguinal hernia. There is no acute bony abnormality. Degenerative changes of the  spine. IMPRESSION: 1. Findings consistent of small bowel obstruction. There is transient point within a right anterior pelvic wall ventral hernia that shows protrusion of small bowel loops. 2. Multiple additional ventral hernias with protrusion of the transverse colon and small bowel with no obstructing components within those ventral hernias. 3. Soft tissue nodule right mid abdomen and smaller nodules right lower abdomen possibly enlarged reactive lymph nodes. Recommend continued follow-up. Dictated by: Shaun Maxwell M.D. Images and Report reviewed and interpreted by: Shaun Maxwell M.D. <PS><Electronically signed by: Shaun Maxwell M.D.> 01/21/2023 1716 D: 01/21/2023 1708 T: 01/21/2023 1708        Assessment:  End-stage Primary Left Knee Osteoarthritis    Plan:  Patient's pain is becoming disabling, despite extensive conservative treatment.  Radiographs reveal end-stage degenerative  changes.  The risks of surgery, including, but not limited to, heart attack, stroke, dying, DVT, nerve injury, vascular injury, arthrofibrosis and infection were discussed.  The alternatives and benefits were also discussed.  All questions answered and the patient wishes to proceed with left total knee arthroplasty.    Nhan Aguilar PA-C  [facility]  [address] [place], [place] [address]  [phone]    2/14/2023    CC: Delbert Selby MD, Nhan Beard MD

## 2023-02-14 NOTE — ANESTHESIA PROCEDURE NOTES
Airway  Urgency: elective    Date/Time: 2/14/2023 12:18 PM  Airway not difficult    General Information and Staff    Patient location during procedure: OR  Anesthesiologist: Srikanth Jimenez MD  CRNA/CAA: Kathy Medrano CRNA    Indications and Patient Condition  Indications for airway management: airway protection    Preoxygenated: yes  Mask difficulty assessment: 2 - vent by mask + OA or adjuvant +/- NMBA    Final Airway Details  Final airway type: endotracheal airway      Successful airway: ETT  Cuffed: yes   Successful intubation technique: direct laryngoscopy  Facilitating devices/methods: intubating stylet  Endotracheal tube insertion site: oral  Blade: Morin  Blade size: 2  ETT size (mm): 8.0  Cormack-Lehane Classification: grade I - full view of glottis  Placement verified by: chest auscultation and capnometry   Measured from: lips  ETT/EBT  to lips (cm): 21  Number of attempts at approach: 1  Assessment: lips, teeth, and gum same as pre-op and atraumatic intubation    Additional Comments  Atraumatic, MOP to cuff, BSBE, no change to dentition, secured with tape

## 2023-02-14 NOTE — DISCHARGE SUMMARY
Orthopaedic Surgery Discharge Summary    Patient Name:  Ant Massey  YOB: 1965  Age: 57 y.o.  Medical Records Number:  [id number]    Date of Admission:  2/14/2023  Date of Discharge:  2/15/2023    Primary Discharge Diagnosis:  Primary osteoarthritis of left knee [M17.12]    Secondary Discharge Diagnosis:    Problems Addressed this Visit        Musculoskeletal and Injuries    * (Principal) Primary osteoarthritis of left knee - Primary    Relevant Medications    HYDROcodone-acetaminophen (NORCO)  MG per tablet   Diagnoses       Codes Comments    Primary osteoarthritis of left knee    -  Primary ICD-10-CM: M17.12  ICD-9-CM: 715.16           Procedures Performed:  Left Total Knee Arthroplasty      Hospital Course:    Ant Massey is a 57 y.o.  male who underwent successful left tka on 2/14/2023.  Ant Massey was started on Aspirin 325 mg po twice daily post-operatively for DVT prophylaxis.  On post-op day 1 the patients dressing was changed and their incision was clean, with no signs of infection and their calf was soft, with no signs of DVT.  The patient progressed well with physical therapy and the patients hemoglobin remained stable. On post-operative day 1 the patient was felt ready for discharge.     Vitals:  Vitals:    02/14/23 1530 02/14/23 1545 02/14/23 1600 02/14/23 1615   BP: 134/63 129/82 134/77 138/74   BP Location:       Patient Position:       Pulse: 75 77 91 71   Resp: 12 18 14 16   Temp:  98.5 °F (36.9 °C)     TempSrc:  Oral     SpO2: 98% 96% 92% 96%       Discharge Medications:      Discharge Medications      New Medications      Instructions Start Date   aspirin  MG tablet   325 mg, Oral, 2 Times Daily With Meals   Start Date: February 15, 2023     docusate sodium 250 MG capsule  Commonly known as: COLACE   250 mg, Oral, 2 Times Daily PRN      HYDROcodone-acetaminophen  MG per tablet  Commonly known as: NORCO   1-2 tablets, Oral, Every 4 Hours PRN          Continue These Medications      Instructions Start Date   escitalopram 10 MG tablet  Commonly known as: LEXAPRO   10 mg, Oral, Nightly         Stop These Medications    Chlorhexidine Gluconate 2 % pads     naproxen sodium 220 MG tablet  Commonly known as: ALEVE            Pain Medications:  Norco 10/325 mg 1-2 po q 4-6 hours prn pain    DVT Prophylaxis:  Enteric Coated Aspirin 325 mg po twice daily for 2 weeks, then one daily for 4 weeks      Total Knee Joint Replacement Discharge Instructions:    I. ACTIVITIES:  1. Exercises:  ? Complete exercise program as taught by the hospital physical therapist 2 times per day  ? Exercise program will be advanced by the physical therapist  ? During the day be up ambulating every 2 hours (while awake) for short distances  ? Complete the ankle pump exercises at least 10 times per hour (while awake)  ? Elevate legs most of the day the first week post operatively and thereafter elevate legs when in bed and for at least 30 minutes during the day. Caution must be taken to avoid pillow placement under the bend of the knee as this can led to flexion contractures of the knee.  ? Use cold packs 20-30 minutes approximately 5 times per day. This should be done before and after completing your exercises and at any time you are experiencing pain/ stiffness in your operative extremity.      2. Activities of Daily Living:  ? No tub baths, hot tubs, or swimming pools for 4 weeks  ? May shower and let water run over the incision on post-operative day #5 if no drainage. Do not scrub or rub the incision. Simply let the water run over the incision and pat dry.    II. Restrictions  ? Do not cross legs or kneel  ? Your surgeon will discuss with you when you will be able to drive again.  ? Weight bearing is as tolerated  ? First week stay inside on even terrain. May go up and down stairs one stair at a time utilizing the hand rail  ? After one week, you may venture outside.    III.  Precautions:  ? Everyone that comes near you should wash their hands  ? No elective dental, genital-urinary, or colon procedures or surgical procedures for 12 weeks after surgery unless absolutely necessary.  ?  If dental work or surgical procedure is deemed absolutely necessary, you will need to contact your surgeon as you will need to take antibiotics 1 hour prior to any dental work (including teeth cleanings).  ? Please discuss with your surgeon prophylactic antibiotics as the length of time this intervention will be necessary for you varies with each patient’s health history and situation.  ? Avoid sick people. If you must be around someone who is ill, they should wear a mask.  ? Avoid visits to the Emergency Room or Urgent Care unless you are having a life threatening event.   ? If ordered stockings are to be placed on in the morning and removed at night. Monitor the stockings to ensure that any swelling is not causing the stockings to become too tight. In this case, remove stockings immediately.    IV. INCISION CARE:  ? Wash your hands prior to dressing changes  ? Change the dressing as needed to keep incision clean and dry. Utilize dry gauze and paper tape. Avoid touching the side of the gauze that goes against the incision with your hands.  ? No creams or ointments to the incision  ? May remove dressing once the incision is free of drainage  ? Do not touch or pick at the incision  ? Check incision every day and notify surgeon immediately if any of the following signs or symptoms are noted:  o Increase in redness  o Increase in swelling around the incision and of the entire extremity  o Increase in pain  o Drainage oozing from the incision  o Pulling apart of the edges of the incision  o Increase in overall body temperature (greater than 100.5 degrees)  ? Your surgeon will instruct you regarding suture or staple removal    V. Medications:   1. Anticoagulants: You will be discharged on an anticoagulant. This  is a prophylactic medication that helps prevent blood clots during your post-operative period. The type and length of dosage varies based on your individual needs, procedure performed, and surgeon’s preference.  ? While taking the anticoagulant, you should avoid taking any additional aspirin, ibuprofen (Advil or Motrin), Aleve (Naprosyn) or other non-steroidal anti-inflammatory medications.   ? Notify surgeon immediately if any nettie bleeding is noted in the urine, stool, emesis, or from the nose or the incision. Blood in the stool will often appear as black rather than red. Blood in urine may appear as pink. Blood in emesis may appear as brown/black like coffee grounds.  ? You will need to apply pressure for longer periods of time to any cuts or abrasions to stop bleeding  ? Avoid alcohol while taking anticoagulants    2. Stool Softeners: You will be at greater risk of constipation after surgery due to being less mobile and the pain medications.   ? Take stool softeners as instructed by your surgeon while on pain medications. Over the counter Colace 100 mg 1-2 capsules twice daily.   ? If stools become too loose or too frequent, please decreases the dosage or stop the stool softener.  ? If constipation occurs despite use of stool softeners, you are to continue the stool softeners and add a laxative (Milk of Magnesia 1 ounce daily as needed)  ? Drink plenty of fluids, and eat fruits and vegetables during your recovery time    3. Pain Medications utilized after surgery are narcotics and the law requires that the following information be given to all patients that are prescribed narcotics:  ? CLASSIFICATION: Pain medications are called Opioids and are narcotics  ? LEGALITIES: It is illegal to share narcotics with others and to drive within 24 hours of taking narcotics  ? POTENTIAL SIDE EFFECTS: Potential side effects of opioids include: nausea, vomiting, itching, dizziness, drowsiness, dry mouth, constipation, and  difficulty urinating.  ? POTENTIAL ADVERSE EFFECTS:   o Opioid tolerance can develop with use of pain medications and this simply means that it requires more and more of the medication to control pain; however, this is seen more in patients that use opioids for longer periods of time.  o Opioid dependence can develop with use of Opioids and this simply means that to stop the medication can cause withdrawal symptoms; however, this is seen with patients that use Opioids for longer periods of time.  o Opioid addiction can develop with use of Opioids and the incidence of this is very unlikely in patients who take the medications as ordered and stop the medications as instructed.  o Opioid overdose can be dangerous, but is unlikely when the medication is taken as ordered and stopped when ordered. It is important not to mix opioids with alcohol or with and type of sedative such as Benadryl as this can lead to over sedation and respiratory difficulty.  ? DOSAGE:   o Pain medications will need to be taken consistently for the first week to decrease pain and promote adequate pain relief and participation in physical therapy.  o After the initial surgical pain begins to resolve, you may begin to decrease the pain medication. By the end of 6-8 weeks, you should be off of pain medications.  o Refills will not be given by the office during evening hours, on weekends, or after 6-8 weeks post-op.  o To seek refills on pain medications during the initial 6 week post-operative period, you must call the office 48 hours in advance to request the refill. The office will then notify you when to  the prescription. DO NOT wait until you are out of the medication to request a refill.    V. FOLLOW-UP VISITS:  ? You will need to follow up in the office with your surgeon in 3 weeks. Please call this number [phone] to schedule this appointment.  If you have any concerns or suspected complications prior to your follow up visit,  please call your surgeons office. Do not wait until your appointment time if you suspect complications. These will need to be addressed in the office promptly.    Nhan Aguilar PA-C  [facility]  [address] [place], [place] [address] [phone]    2/14/2023    CC:Delbert Selby MD:Nhan Beard MD

## 2023-02-14 NOTE — ANESTHESIA PREPROCEDURE EVALUATION
Anesthesia Evaluation     no history of anesthetic complications:  NPO Solid Status: > 8 hours  NPO Liquid Status: > 2 hours           Airway   Mallampati: II  Neck ROM: full  no difficulty expected  Dental - normal exam     Pulmonary - normal exam   (+) sleep apnea on CPAP,   (-) COPD, asthma, not a smoker    PE comment: nonlabored  Cardiovascular - negative cardio ROS and normal exam  Exercise tolerance: good (4-7 METS)    Rhythm: regular  Rate: normal    (-) hypertension, valvular problems/murmurs, past MI, CAD, dysrhythmias, angina      Neuro/Psych  (+) psychiatric history Anxiety,    (-) seizures, TIA, CVA  GI/Hepatic/Renal/Endo    (+) morbid obesity,    (-) GERD, liver disease, no renal disease, diabetes, no thyroid disorder    Musculoskeletal (-) negative ROS    Abdominal    Substance History      OB/GYN          Other      history of cancer (colon cancer) remission                  Anesthesia Plan    ASA 3     general     (AC block for post-op pain PSR)  intravenous induction     Anesthetic plan, risks, benefits, and alternatives have been provided, discussed and informed consent has been obtained with: patient.        CODE STATUS:

## 2023-02-14 NOTE — CASE MANAGEMENT/SOCIAL WORK
Continued Stay Note  [facility]     Patient Name: Ant Massey  MRN: [id number]  Today's Date: 2/14/2023    Admit Date: 2/14/2023    Plan: Home with [facility]    Discharge Plan     Row Name 02/14/23 1734       Plan    Plan Home with [facility]     Plan Comments CCP followed up with patient regarding discharge planning. Patient confirmed plan is to dicsharge home with [facility] , wife to transport home. CCP to follow.               Discharge Codes    No documentation.               Expected Discharge Date and Time     Expected Discharge Date Expected Discharge Time    Feb 15, 2023

## 2023-02-14 NOTE — OP NOTE
Orthopaedic Surgery Operative Note    Patient Name:  Ant Massey  YOB: 1965  Age: 57 y.o.  Medical Records Number:  [id number]    Date of Procedure:  2/14/2023    Pre-operative Diagnosis:  Primary Osteoarthritis Left Knee    Post-operative Diagnosis:  Primary Osteoarthritis Left Knee    Procedure Performed:  Left Total Knee Arthroplasty    Implants:  Biomet Vanguard TKA, 75 Femoral Component, 83 Tibial Tray with a 40 mm Modular Stem, 37 3-Peg Patella, 13 mm Posterior Lipped E1 Polyethylene Insert    Surgeon:  Nhan Beard M.D.    Assistant: Joe Triplett (who was present during the critical portions of the case, thereby decreasing operative time and patient morbidity)    Anesthetic Type:  General    Estimated Blood Loss:  250cc's    Specimens: * No orders in the log *    No Complications      Indications for Procedure:  Ant Massey is a 57 y.o. male suffering from end stage degenerative changes in the left knee.  The patients pain is becoming disabling, despite extensive conservative care, including NSAIDS, therapy and injections. The risks, benefits and alternatives were discussed and the patient wishes to proceed with left total knee arthroplasty.      Procedure Performed:    After informed consent was obtained, the correct patient identified, the correct operative side marked by the operative surgeon, and pre-operative IV Kefzol  given (prior to tourniquet inflation), the patient was taken to the operating room and placed supine on the operating table.  After general anesthesia was induced, a surgical time out was performed and 1 gm of Tranxemic Acid given, a well-padded tourniquet was placed and the patient's left lower extremity was prepped with chloraprep and draped in a sterile fashion.    A midline incision was made overlying the left knee and we sharply dissected down to expose the parapatellar retinaculum.  A muscle sparing, mid-vastus, parapatellar arthrotomy was performed  and we elevated the soft tissue both medially and laterally.  The menisci and ACL were excised.  We everted the patella and measured this to be 25 mm and we removed 10 mm of bone down to 15 mm.  We measured the patella to be 37 and drilled our three drill holes.  We protected the patella with the patella protector and turned our attention to the femur and the tibia.    We drilled drill holes into the femoral and the tibial intramedullary canals and proceeded to irrigate the canals to remove the fatty marrow.  We used the intramedullary jeevan and the 5 degree valgus cut block to make our distal femoral cut.  Once we had a smooth surface, we measured the femur to be 75.  We assured we had rotational alignment using the epicondylar axis, then we used the four-in-one cut block to make our anterior, posterior, anterior and posterior chamfer cuts.  We placed our femoral trial, had excellent fit, extended the knee and marked Ashmore's line on the tibia.      We then turned our attention to the tibia, where we irrigated the canal again.  We used the intramedullary jeevan and the 3 degree posterior sloped cut block to remove 2 mm of bone from the effected side of the tibia.  Prior to making our cut, we assured we had rotational alignment using the external guide and Ashmore's line.  Once we had a smooth surface, we measured the tibia to be 83.  We then removed soft tissue and bony debris from the posterior aspect of the knee.    We placed our trial implants and had excellent fit, range of motion, stability and ligament balance, throughout a complete arc of motion with a 14 std polyethylene liner.  We removed our trial implants, punched the tibial keel, copiously irrigated the knee, then cemented our implants in place using Biomet cement.  Once the cement cured, we trialed again with the 12 and 14 AS, standard and posterior lipped polyethylene liners.  The 12's were too loose and the 14's were too tight, so we chose a 13  "lipped.  The 13 lipped gave us the best range of motion, stability and ligament balance, throughout a complete arc of motion.  We removed the trials, copiously irrigated the knee with dilute betadine solution, gave IV antibiotics and local anesthetic, then placed our permanent polyethylene liner.  We placed a 1/8\" hemovac drain, then closed the arthrotomy with #1 Vicryl pop-off sutures.  The subcutaneous tissue was closed with 2-0 vicryl pop-off sutures.  The skin was closed with staples.  We placed a sterile dressing of Xeroform, 4x4's, abdominal pads, cast padding and an Ace Wrap.  All sponge and needle counts were correct.  The patient was then awakened from general anesthesia and taken to the recovery room in stable condition.    The patient will be started on Aspirin 325 mg twice daily for DVT prophylaxis.  IV antibiotics will be discontinued within 24 hours of surgery.  Immediately prior to surgery, there were no acute Thromboembolic nor Cardiovascular risk factors.  An updated Medical Reconciliation form is on the chart.    Nhan Aguilar PA-C  [facility]  [address] [place] [address]  [phone]    2/14/2023        "

## 2023-02-14 NOTE — DISCHARGE PLACEMENT REQUEST
"Yolie Rg (57 y.o. Male)     Date of Birth   1965    Social Security Number       Address   [address] [place] [address]    Home Phone   [phone]    MRN   [id number]       [employer]   [employer]    Marital Status                               Admission Date   2/14/23    Admission Type   Elective    Admitting Provider   Nhan Beard MD    Attending Provider   Nhan Beard MD    Department, Room/Bed   [facility] OR, [facility] OR       Discharge Date       Discharge Disposition       Discharge Destination                               Attending Provider: Nhan Beard MD    Allergies: Oxycodone    Isolation: None   Infection: None   Code Status: Not on file    Ht: 182.9 cm (72\")   Wt: 154 kg (339 lb)    Admission Cmt: None   Principal Problem: Primary osteoarthritis of left knee [M17.12]                 Active Insurance as of 2/14/2023     Primary Coverage     Payor Plan Insurance Group Employer/Plan Group    [employer] [id number]     Payor Plan Address Payor Plan Phone Number Payor Plan Fax Number Effective Dates    [address] [address]   9/27/2015 - None Entered    [place] [address]       Subscriber Name Subscriber Birth Date Member ID       YOLIE RG 1965 [id number]                 Emergency Contacts      (Rel.) Home Phone Work Phone Mobile Phone    BrysonPat (Spouse) [phone] -- --              "

## 2023-02-15 VITALS
WEIGHT: 315 LBS | DIASTOLIC BLOOD PRESSURE: 76 MMHG | BODY MASS INDEX: 42.66 KG/M2 | HEIGHT: 72 IN | RESPIRATION RATE: 16 BRPM | TEMPERATURE: 98.7 F | OXYGEN SATURATION: 97 % | SYSTOLIC BLOOD PRESSURE: 131 MMHG | HEART RATE: 81 BPM

## 2023-02-15 LAB
ANION GAP SERPL CALCULATED.3IONS-SCNC: 6 MMOL/L (ref 5–15)
BUN SERPL-MCNC: 11 MG/DL (ref 6–20)
BUN/CREAT SERPL: 15.1 (ref 7–25)
CALCIUM SPEC-SCNC: 8.4 MG/DL (ref 8.6–10.5)
CHLORIDE SERPL-SCNC: 103 MMOL/L (ref 98–107)
CO2 SERPL-SCNC: 28 MMOL/L (ref 22–29)
CREAT SERPL-MCNC: 0.73 MG/DL (ref 0.76–1.27)
DEPRECATED RDW RBC AUTO: 41.9 FL (ref 37–54)
EGFRCR SERPLBLD CKD-EPI 2021: 106.8 ML/MIN/1.73
ERYTHROCYTE [DISTWIDTH] IN BLOOD BY AUTOMATED COUNT: 14.3 % (ref 12.3–15.4)
GLUCOSE SERPL-MCNC: 117 MG/DL (ref 65–99)
HCT VFR BLD AUTO: 38 % (ref 37.5–51)
HGB BLD-MCNC: 12.2 G/DL (ref 13–17.7)
MCH RBC QN AUTO: 26.1 PG (ref 26.6–33)
MCHC RBC AUTO-ENTMCNC: 32.1 G/DL (ref 31.5–35.7)
MCV RBC AUTO: 81.2 FL (ref 79–97)
PLATELET # BLD AUTO: 199 10*3/MM3 (ref 140–450)
PMV BLD AUTO: 9.1 FL (ref 6–12)
POTASSIUM SERPL-SCNC: 4.1 MMOL/L (ref 3.5–5.2)
RBC # BLD AUTO: 4.68 10*6/MM3 (ref 4.14–5.8)
SODIUM SERPL-SCNC: 137 MMOL/L (ref 136–145)
WBC NRBC COR # BLD: 12.97 10*3/MM3 (ref 3.4–10.8)

## 2023-02-15 PROCEDURE — 85027 COMPLETE CBC AUTOMATED: CPT | Performed by: ORTHOPAEDIC SURGERY

## 2023-02-15 PROCEDURE — 97162 PT EVAL MOD COMPLEX 30 MIN: CPT

## 2023-02-15 PROCEDURE — 97530 THERAPEUTIC ACTIVITIES: CPT

## 2023-02-15 PROCEDURE — 80048 BASIC METABOLIC PNL TOTAL CA: CPT | Performed by: ORTHOPAEDIC SURGERY

## 2023-02-15 PROCEDURE — G0378 HOSPITAL OBSERVATION PER HR: HCPCS

## 2023-02-15 PROCEDURE — 25010000002 KETOROLAC TROMETHAMINE PER 15 MG: Performed by: ORTHOPAEDIC SURGERY

## 2023-02-15 RX ADMIN — MUPIROCIN 1 APPLICATION: 20 OINTMENT TOPICAL at 08:29

## 2023-02-15 RX ADMIN — ASPIRIN 325 MG: 325 TABLET, COATED ORAL at 08:29

## 2023-02-15 RX ADMIN — FERROUS SULFATE TAB 325 MG (65 MG ELEMENTAL FE) 325 MG: 325 (65 FE) TAB at 08:29

## 2023-02-15 RX ADMIN — Medication 10 ML: at 08:29

## 2023-02-15 RX ADMIN — HYDROCODONE BITARTRATE AND ACETAMINOPHEN 1 TABLET: 10; 325 TABLET ORAL at 07:30

## 2023-02-15 RX ADMIN — CLINDAMYCIN PHOSPHATE 900 MG: 900 INJECTION, SOLUTION INTRAVENOUS at 02:04

## 2023-02-15 RX ADMIN — KETOROLAC TROMETHAMINE 30 MG: 30 INJECTION, SOLUTION INTRAMUSCULAR; INTRAVENOUS at 02:04

## 2023-02-15 RX ADMIN — HYDROCODONE BITARTRATE AND ACETAMINOPHEN 1 TABLET: 10; 325 TABLET ORAL at 02:04

## 2023-02-15 NOTE — PLAN OF CARE
Goal Outcome Evaluation:      Patient and spouse given discharge instructions and both verbalized understanding. Belongings sent with spouse and IV removed intact. Discharge to home via private transportation.

## 2023-02-15 NOTE — PLAN OF CARE
Goal Outcome Evaluation:  Plan of Care Reviewed With: patient        Progress: improving  Outcome Evaluation: POD#1 OF Left  total knee. Dressing to knee dry and intact. VSS. PO pain medication helping with pain. Ambulating fine with assistance. voiding per urinal. Education provided on pain control and sleep apnea. Patient verbalized understanding. Discharge home today.

## 2023-02-15 NOTE — PLAN OF CARE
Goal Outcome Evaluation:  Plan of Care Reviewed With: patient           Outcome Evaluation: Pt is a 56 y/o M POD1 L TKA. Plans home with wife today, has 3 JADEN with L HR. Pt was able to perform bed mobility with SV and ambulates with CGA and use of RW. Gait is slow and antalgic, expected d/t surgery. Pt was able to practice steps today, has HR on L side at home. Performed sideways facing HR step-to pattern with CGA. Pt feels safe for D/C today, no concerns from PT stand point. Will sign off.

## 2023-02-15 NOTE — PROGRESS NOTES
Orthopaedic Surgery  Progress Note  2/15/2023    Patients Name:  Ant Massey  YOB: 1965  Age: 57 y.o.  Medical Records Number:  [id number]  Date of Admission: 2/14/2023    No complaints except appropriate pain and stiffness in the left knee    Vitals:  Vitals:    02/14/23 1726 02/14/23 2143 02/15/23 0146 02/15/23 0535   BP: 155/97 121/71 134/89 131/76   BP Location: Right arm Right arm Right arm Right arm   Patient Position: Lying Lying Lying Lying   Pulse: 75 77 82 81   Resp: 16 16 16 16   Temp: 98.4 °F (36.9 °C) 98.4 °F (36.9 °C) 98.4 °F (36.9 °C) 98.7 °F (37.1 °C)   TempSrc: Oral Oral Oral Oral   SpO2: 93% 92% 93% 97%   Weight:       Height:           LLE:  NVI, calf nontender, Sensation intact  No signs of DVT    Incision: clean, no signs of infection    Lab Results (last 24 hours)     Procedure Component Value Units Date/Time    Basic Metabolic Panel [[id number]]  (Abnormal) Collected: 02/15/23 0457    Specimen: Blood Updated: 02/15/23 0537     Glucose 117 mg/dL      BUN 11 mg/dL      Creatinine 0.73 mg/dL      Sodium 137 mmol/L      Potassium 4.1 mmol/L      Chloride 103 mmol/L      CO2 28.0 mmol/L      Calcium 8.4 mg/dL      BUN/Creatinine Ratio 15.1     Anion Gap 6.0 mmol/L      eGFR 106.8 mL/min/1.73     Narrative:      GFR Normal >60  Chronic Kidney Disease <60  Kidney Failure <15      CBC (No Diff) [[id number]]  (Abnormal) Collected: 02/15/23 0457    Specimen: Blood Updated: 02/15/23 0520     WBC 12.97 10*3/mm3      RBC 4.68 10*6/mm3      Hemoglobin 12.2 g/dL      Hematocrit 38.0 %      MCV 81.2 fL      MCH 26.1 pg      MCHC 32.1 g/dL      RDW 14.3 %      RDW-SD 41.9 fl      MPV 9.1 fL      Platelets 199 10*3/mm3           XR Knee 1 or 2 View Left    Result Date: 2/14/2023  Narrative: XR KNEE 1 OR 2 VW LEFT-  INDICATIONS: Postoperative evaluation.  TECHNIQUE: Frontal and lateral views of the left knee  COMPARISON: 01/31/2023  FINDINGS:   Intact appearing knee arthroplasty hardware is  seen with adjacent surgical soft tissue gas, drain, overlying skin staples. No acute fracture is identified.       Impression:  Postsurgical changes.    This report was finalized on 2/14/2023 2:39 PM by Dr. Ed Cole M.D.      Peripheral Block    Result Date: 2/14/2023  Narrative: Adam Galindo MD     2/14/2023 11:06 AM Peripheral Block Patient reassessed immediately prior to procedure Patient location during procedure: holding area Reason for block: at surgeon's request and post-op pain management Performed by Anesthesiologist: Adam Galindo MD Preanesthetic Checklist Completed: patient identified, IV checked, site marked, risks and benefits discussed, surgical consent, monitors and equipment checked, pre-op evaluation and timeout performed Prep: Sterile barriers:cap, gloves, mask and sterile barriers Prep: ChloraPrep Patient monitoring: blood pressure monitoring, continuous pulse oximetry and EKG Procedure Sedation: yes Performed under: local infiltration Guidance:ultrasound guided ULTRASOUND INTERPRETATION.  Using ultrasound guidance a 21 G gauge needle was placed in close proximity to the nerve, at which point, under ultrasound guidance anesthetic was injected in the area of the nerve and spread of the anesthesia was seen on ultrasound in close proximity thereto.  There were no abnormalities seen on ultrasound; a digital image was taken; and the patient tolerated the procedure with no complications. Images:still images obtained Laterality:left Block Type:adductor canal block Injection Technique:single-shot Needle Type:echogenic Needle Gauge:21 G Resistance on Injection: none Medications Used: ropivacaine (NAROPIN) 0.5 % injection - Injection  20 mL - 2/14/2023 11:06:00 AM Post Assessment Injection Assessment: negative aspiration for heme, no paresthesia on injection and incremental injection Patient Tolerance:comfortable throughout block Complications:no Additional Notes Ultrasound  interpretation note:  Under ultrasound guidance, needle seen near nerves, local seen spreading around.  No abnormalities noted. Block for postop pain per surgeon request.     XR Chest PA & Lateral, XR Knee 1 or 2 View Left    Result Date: 2023  Narrative: XR CHEST PA AND LATERAL-, XR KNEE 1 OR 2 VW LEFT-  HISTORY: Male who is 57 years-old,  preoperative evaluation  TECHNIQUE: Frontal and lateral views of the chest, 2 views of the left knee  COMPARISON: None available  FINDINGS:  Chest x-ray: Heart, mediastinum and pulmonary vasculature are unremarkable. No focal pulmonary consolidation, pleural effusion, or pneumothorax. No acute osseous process.  Left knee: Moderate to prominent medial tibiofemoral joint space narrowing is present. Moderate degenerative spurring is noted. Trace knee effusion. No acute fracture, erosion, or dislocation is identified.      Impression: As described.  This report was finalized on 2023 10:22 AM by Dr. Ed Cole M.D.      CT Abdomen & Pelvis W IV Contrast Without Oral Contrast    Result Date: 2023  Narrative: REVIEWING YOUR TEST RESULTS IN [facility] IS NOT A SUBSTITUTE FOR DISCUSSING THOSE RESULTS WITH YOUR HEALTH CARE PROVIDER. PLEASE CONTACT YOUR PROVIDER VIA [facility] TO DISCUSS ANY QUESTIONS OR CONCERNS YOU MAY HAVE REGARDING THESE TEST RESULTS.  RADIOLOGY REPORT FACILITY:  [facility] UNIT/AGE/GENDER: MOLLY  ER      AGE:57 Y          SEX:M PATIENT NAME/:  YOLIE RG MICHAEL    1965 UNIT NUMBER:  [id number] ACCOUNT NUMBER:  [id number] ACCESSION NUMBER:  [id number] EXAMINATION: CT ABDOMEN AND PELVIS W IV CONTRAST DATE: 2023 HISTORY: Nausea/vomiting upper abdominal pain TECHNIQUE:  Multiple axial 5 mm images obtained of the abdomen and pelvis with 100 ml of nonionic intravenous contrast. Imaging was performed from the lung bases to the pubic symphysis. CT scans at this facility use dose modulation, iterative  reconstruction and/or weight based dosing when appropriate to reduce radiation dose to as low as reasonably achievable. COMPARISON:  October 31, 2022. FINDINGS: Anastomotic sutures noted at the sigmoid colon. There are at least 5 ventral hernia is noted of varying sizes. A ventral hernia seen on image 67 of series 2 with hernia defect approximately 4 cm shows protrusion of the transverse colon with associated mesenteric fat without obstruction. The stomach is unremarkable. There is fluid filled dilated small bowel loops. A more inferior right-sided ventral hernia at the peribronchial level shows protrusion of portions of small bowel segment without obstruction. More inferiorly at the right anterior pelvic wall image 17 shows a another ventral hernia right of midline as seen on image 115. The hernia defect grossly measures 2.3 cm. This protrusion of small bowel that is thickened and likely represents the transition point. The distal small bowel loops show normal caliber otherwise. There is a 1.8 cm soft tissue nodule at the right mid abdomen image 91 of series 2. Additional mildly enlarged nodules at the right lower quadrant possibly reactive. Prostate gland shows small calcifications. Mild wall thickening urinary bladder nonspecific possibly due to underdistention. There is mild bibasilar atelectasis. No free intraperitoneal air is identified. Prior cholecystectomy. The liver and spleen otherwise unremarkable. Bilateral adrenal glands and the pancreas are within normal limits. The previously seen common bile duct stent has been removed. There is mild residual pneumobilia within the common bile duct. Mild mesenteric edema. Mild stranding within the mesentery. Small fat-containing left inguinal hernia. There is no acute bony abnormality. Degenerative changes of the  spine. IMPRESSION: 1. Findings consistent of small bowel obstruction. There is transient point within a right anterior pelvic wall ventral hernia that  shows protrusion of small bowel loops. 2. Multiple additional ventral hernias with protrusion of the transverse colon and small bowel with no obstructing components within those ventral hernias. 3. Soft tissue nodule right mid abdomen and smaller nodules right lower abdomen possibly enlarged reactive lymph nodes. Recommend continued follow-up. Dictated by: Shaun Maxwell M.D. Images and Report reviewed and interpreted by: Shaun Maxwell M.D. <PS><Electronically signed by: Shaun Maxwell M.D.> 01/21/2023 1716 D: 01/21/2023 1708 T: 01/21/2023 1708      Assesment/Plan:    Procedures:  Left TKA  Postoperative Day: 1  Weightbearing Status:  WBAT with walker  DVT Prophylaxis:  ASA for DVT prophylaxis    Disposition:  Home with home health after PT today, if comfortable and mobilizing safely    Nhan Aguilar PA-C  [facility]  [address] [place] [address]  [phone]    2/15/2023

## 2023-02-15 NOTE — PLAN OF CARE
Goal Outcome Evaluation:              Outcome Evaluation: patient received from [facility], s/p RT LT TKA , w/ HVAC, dressing c/d/i, AxOx4, on room air, SCD's on, encouraged IS. PRN pain meds given, voiding per BR, up in chair today. IV came out when IV ABT this evening was about to be given, attempted to reinsert but unsuccessful, IV team was called to put one.Needs attended, stable.

## 2023-02-15 NOTE — THERAPY EVALUATION
Patient Name: Ant Massey  : 1965    MRN: [id number]                              Today's Date: 2/15/2023       Admit Date: 2023    Visit Dx:     ICD-10-CM ICD-9-CM   1. Primary osteoarthritis of left knee  M17.12 715.16     Patient Active Problem List   Diagnosis   • Primary osteoarthritis of left knee     Past Medical History:   Diagnosis Date   • Anxiety    • Colon cancer (HCC)    • Hearing loss     BELINDA   • Knee pain, left    • Sleep apnea     CPAP   • Varicose vein of leg     BELINDA   • Ventral hernia      Past Surgical History:   Procedure Laterality Date   • ACHILLES TENDON SURGERY Right 2015   • COLON RESECTION     • COLONOSCOPY     • ERCP     • GALLBLADDER SURGERY     • KNEE MENISCAL REPAIR Right       General Information     Row Name 02/15/23 1043          Physical Therapy Time and Intention    Document Type evaluation  -DB     Mode of Treatment individual therapy;physical therapy  -DB     Row Name 02/15/23 1043          General Information    Patient Profile Reviewed yes  -DB     Prior Level of Function independent:  -DB     Existing Precautions/Restrictions fall  -DB     Barriers to Rehab none identified  -DB     Row Name 02/15/23 1043          Living Environment    People in Home spouse  -DB     Row Name 02/15/23 1043          Home Main Entrance    Number of Stairs, Main Entrance three  -DB     Stair Railings, Main Entrance railings safe and in good condition  -DB     Row Name 02/15/23 1043          Stairs Within Home, Primary    Number of Stairs, Within Home, Primary none  -DB     Row Name 02/15/23 1043          Cognition    Orientation Status (Cognition) oriented x 4  -DB     Row Name 02/15/23 1043          Safety Issues, Functional Mobility    Impairments Affecting Function (Mobility) pain;strength;range of motion (ROM);endurance/activity tolerance  -DB           User Key  (r) = Recorded By, (t) = Taken By, (c) = Cosigned By    Initials Name Provider Type    DB Kathy Irby, PT  Physical Therapist               Mobility     [facility] Name 02/15/23 1045          Bed Mobility    Bed Mobility supine-sit  -DB     Supine-Sit Rosenberg (Bed Mobility) supervision  -DB     Assistive Device (Bed Mobility) head of bed elevated;bed rails  -DB     Row Name 02/15/23 1045          Sit-Stand Transfer    Sit-Stand Rosenberg (Transfers) contact guard;verbal cues  -DB     Assistive Device (Sit-Stand Transfers) walker, front-wheeled  -DB     Row Name 02/15/23 1045          Gait/Stairs (Locomotion)    Rosenberg Level (Gait) contact guard;verbal cues  -DB     Assistive Device (Gait) walker, front-wheeled  -DB     Distance in Feet (Gait) 160'  -DB     Deviations/Abnormal Patterns (Gait) gait speed decreased;stride length decreased;antalgic  -DB     Bilateral Gait Deviations heel strike decreased;forward flexed posture  -DB     Rosenberg Level (Stairs) contact guard;verbal cues  -DB     Handrail Location (Stairs) other (see comments)  sideways at step, facing L HR  -DB     Number of Steps (Stairs) 3  -DB     Ascending Technique (Stairs) step-to-step  -DB     Descending Technique (Stairs) step-to-step  -DB     Stairs, Impairments ROM decreased;impaired balance;pain;strength decreased  -DB           User Key  (r) = Recorded By, (t) = Taken By, (c) = Cosigned By    Initials Name Provider Type    DB Kathy Irby PT Physical Therapist               Obj/Interventions     [facility] Name 02/15/23 1047          Range of Motion Comprehensive    Comment, General Range of Motion L knee 5-90 degrees AROM  -DB     Row Name 02/15/23 1047          Strength Comprehensive (MMT)    Comment, General Manual Muscle Testing (MMT) Assessment post op weakness  -DB     Row Name 02/15/23 1047          Balance    Balance Assessment sitting static balance;sitting dynamic balance;standing static balance;standing dynamic balance  -DB     Static Sitting Balance supervision  -DB     Dynamic Sitting Balance supervision  -DB     Position,  Sitting Balance unsupported;sitting edge of bed  -DB     Static Standing Balance contact guard  -DB     Dynamic Standing Balance contact guard  -DB     Position/Device Used, Standing Balance supported;walker, front-wheeled  -DB     Balance Interventions sitting;standing;sit to stand  -DB           User Key  (r) = Recorded By, (t) = Taken By, (c) = Cosigned By    Initials Name Provider Type    Kathy Garcia, PT Physical Therapist               Goals/Plan    No documentation.                Clinical Impression     Row Name 02/15/23 1047          Pain    Pain Intervention(s) Ambulation/increased activity;Repositioned  -DB     Row Name 02/15/23 1047          Plan of Care Review    Plan of Care Reviewed With patient  -DB     Outcome Evaluation Pt is a 58 y/o M POD1 L TKA. Plans home with wife today, has 3 JADEN with L HR. Pt was able to perform bed mobility with SV and ambulates with CGA and use of RW. Gait is slow and antalgic, expected d/t surgery. Pt was able to practice steps today, has HR on L side at home. Performed sideways facing HR step-to pattern with CGA. Pt feels safe for D/C today, no concerns from PT stand point. Will sign off.  -DB     Row Name 02/15/23 1047          Therapy Assessment/Plan (PT)    Therapy Frequency (PT) evaluation only  -DB     Row Name 02/15/23 1047          Vital Signs    O2 Delivery Pre Treatment room air  -DB     O2 Delivery Intra Treatment room air  -DB     O2 Delivery Post Treatment room air  -DB     Pre Patient Position Supine  -DB     Intra Patient Position Standing  -DB     Post Patient Position Sitting  -DB           User Key  (r) = Recorded By, (t) = Taken By, (c) = Cosigned By    Initials Name Provider Type    Kathy Garcia PT Physical Therapist               Outcome Measures     Row Name 02/15/23 1050 02/15/23 0829       How much help from another person do you currently need...    Turning from your back to your side while in flat bed without using bedrails? 4  -DB  4  -RAINA    Moving from lying on back to sitting on the side of a flat bed without bedrails? 4  -DB 4  -RAINA    Moving to and from a bed to a chair (including a wheelchair)? 3  -DB 3  -RAINA    Standing up from a chair using your arms (e.g., wheelchair, bedside chair)? 3  -DB 3  -RAINA    Climbing 3-5 steps with a railing? 3  -DB 3  -RAINA    To walk in hospital room? 3  -DB 3  -RAINA    AM-PAC 6 Clicks Score (PT) 20  -DB 20  -RAINA    Highest level of mobility 6 --> Walked 10 steps or more  -DB 6 --> Walked 10 steps or more  -RAINA    Row Name 02/15/23 1050          Functional Assessment    Outcome Measure Options AM-PAC 6 Clicks Basic Mobility (PT)  -DB           User Key  (r) = Recorded By, (t) = Taken By, (c) = Cosigned By    Initials Name Provider Type    DB Kathy Irby, PT Physical Therapist    Esperanza Cuenca, RN Registered Nurse                             Physical Therapy Education     Title: PT OT SLP Therapies (Done)     Topic: Physical Therapy (Done)     Point: Mobility training (Done)     Learning Progress Summary           Patient Acceptance, E, VU by DB at 2/15/2023 1050   Significant Other Acceptance, E, VU by DB at 2/15/2023 1050                   Point: Home exercise program (Done)     Learning Progress Summary           Patient Acceptance, E, VU by DB at 2/15/2023 1050   Significant Other Acceptance, E, VU by DB at 2/15/2023 1050                   Point: Body mechanics (Done)     Learning Progress Summary           Patient Acceptance, E, VU by DB at 2/15/2023 1050   Significant Other Acceptance, E, VU by DB at 2/15/2023 1050                   Point: Precautions (Done)     Learning Progress Summary           Patient Acceptance, E, VU by DB at 2/15/2023 1050   Significant Other Acceptance, E, VU by DB at 2/15/2023 1050                               User Key     Initials Effective Dates Name Provider Type Discipline    DB 06/16/21 -  Kathy Irby, PT Physical Therapist PT              PT Recommendation and  Plan     Plan of Care Reviewed With: patient  Outcome Evaluation: Pt is a 56 y/o M POD1 L TKA. Plans home with wife today, has 3 JADEN with L HR. Pt was able to perform bed mobility with SV and ambulates with CGA and use of RW. Gait is slow and antalgic, expected d/t surgery. Pt was able to practice steps today, has HR on L side at home. Performed sideways facing HR step-to pattern with CGA. Pt feels safe for D/C today, no concerns from PT stand point. Will sign off.     Time Calculation:    PT Charges     Row Name 02/15/23 1050             Time Calculation    Start Time 0950  -DB      Stop Time 1005  -DB      Time Calculation (min) 15 min  -DB      PT Received On 02/15/23  -DB         Time Calculation- PT    Total Timed Code Minutes- PT 8 minute(s)  -DB            User Key  (r) = Recorded By, (t) = Taken By, (c) = Cosigned By    Initials Name Provider Type    DB Kathy Irby, PT Physical Therapist              Therapy Charges for Today     Code Description Service Date Service Provider Modifiers Qty    59668329857  PT EVAL MOD COMPLEXITY 3 2/15/2023 Kathy Irby, PT GP 1    14803856091 HC PT THERAPEUTIC ACT EA 15 MIN 2/15/2023 Kathy Irby, PT GP 1          PT G-Codes  Outcome Measure Options: AM-PAC 6 Clicks Basic Mobility (PT)  AM-PAC 6 Clicks Score (PT): 20       Kathy Irby PT  2/15/2023

## 2023-02-22 ENCOUNTER — TELEPHONE (OUTPATIENT)
Dept: ORTHOPEDIC SURGERY | Facility: HOSPITAL | Age: 58
End: 2023-02-22
Payer: COMMERCIAL

## 2023-02-22 NOTE — TELEPHONE ENCOUNTER
Called and spoke with Mr. Massey to see how he is doing as he is 1 week SP LTK. He said he is doing ok. He is working with PT and making progress. Pian is controlled. Dressing/incision looks good. BM's are getting better. All in all he is making progress and feels like things are on track and moving in the right direction. Mr. Massey doesn't have any questions/concerns for me at this time. He was given my contact information should he need anything. He voiced understanding and appreciated the call.

## 2024-04-19 ENCOUNTER — HOSPITAL ENCOUNTER (EMERGENCY)
Facility: HOSPITAL | Age: 59
Discharge: OTHER INSTITUTION | End: 2024-04-19
Attending: EMERGENCY MEDICINE
Payer: COMMERCIAL

## 2024-04-19 ENCOUNTER — APPOINTMENT (OUTPATIENT)
Dept: GENERAL RADIOLOGY | Facility: HOSPITAL | Age: 59
End: 2024-04-19
Payer: COMMERCIAL

## 2024-04-19 ENCOUNTER — APPOINTMENT (OUTPATIENT)
Dept: CT IMAGING | Facility: HOSPITAL | Age: 59
End: 2024-04-19
Payer: COMMERCIAL

## 2024-04-19 VITALS
HEART RATE: 97 BPM | DIASTOLIC BLOOD PRESSURE: 67 MMHG | WEIGHT: 315 LBS | OXYGEN SATURATION: 98 % | BODY MASS INDEX: 42.66 KG/M2 | SYSTOLIC BLOOD PRESSURE: 138 MMHG | RESPIRATION RATE: 22 BRPM | HEIGHT: 72 IN | TEMPERATURE: 98.9 F

## 2024-04-19 DIAGNOSIS — J18.9 PNEUMONIA OF RIGHT LUNG DUE TO INFECTIOUS ORGANISM, UNSPECIFIED PART OF LUNG: ICD-10-CM

## 2024-04-19 DIAGNOSIS — R00.0 TACHYCARDIA: ICD-10-CM

## 2024-04-19 DIAGNOSIS — J90 PLEURAL EFFUSION: Primary | ICD-10-CM

## 2024-04-19 DIAGNOSIS — K75.0 HEPATIC ABSCESS: ICD-10-CM

## 2024-04-19 LAB
ALBUMIN SERPL-MCNC: 3.8 G/DL (ref 3.5–5.2)
ALBUMIN/GLOB SERPL: 1 G/DL
ALP SERPL-CCNC: 86 U/L (ref 39–117)
ALT SERPL W P-5'-P-CCNC: 16 U/L (ref 1–41)
ANION GAP SERPL CALCULATED.3IONS-SCNC: 11.8 MMOL/L (ref 5–15)
AST SERPL-CCNC: 19 U/L (ref 1–40)
BASOPHILS # BLD AUTO: 0.02 10*3/MM3 (ref 0–0.2)
BASOPHILS NFR BLD AUTO: 0.1 % (ref 0–1.5)
BILIRUB SERPL-MCNC: 0.7 MG/DL (ref 0–1.2)
BUN SERPL-MCNC: 10 MG/DL (ref 6–20)
BUN/CREAT SERPL: 10.8 (ref 7–25)
CALCIUM SPEC-SCNC: 9.3 MG/DL (ref 8.6–10.5)
CHLORIDE SERPL-SCNC: 98 MMOL/L (ref 98–107)
CO2 SERPL-SCNC: 25.2 MMOL/L (ref 22–29)
CREAT SERPL-MCNC: 0.93 MG/DL (ref 0.76–1.27)
D DIMER PPP FEU-MCNC: 0.58 MCGFEU/ML (ref 0–0.57)
D-LACTATE SERPL-SCNC: 1.9 MMOL/L (ref 0.5–2)
DEPRECATED RDW RBC AUTO: 44.5 FL (ref 37–54)
EGFRCR SERPLBLD CKD-EPI 2021: 95.8 ML/MIN/1.73
EOSINOPHIL # BLD AUTO: 0.18 10*3/MM3 (ref 0–0.4)
EOSINOPHIL NFR BLD AUTO: 1.1 % (ref 0.3–6.2)
ERYTHROCYTE [DISTWIDTH] IN BLOOD BY AUTOMATED COUNT: 14.8 % (ref 12.3–15.4)
GEN 5 2HR TROPONIN T REFLEX: 10 NG/L
GLOBULIN UR ELPH-MCNC: 4 GM/DL
GLUCOSE SERPL-MCNC: 115 MG/DL (ref 65–99)
HCT VFR BLD AUTO: 45.8 % (ref 37.5–51)
HGB BLD-MCNC: 14 G/DL (ref 13–17.7)
IMM GRANULOCYTES # BLD AUTO: 0.05 10*3/MM3 (ref 0–0.05)
IMM GRANULOCYTES NFR BLD AUTO: 0.3 % (ref 0–0.5)
LYMPHOCYTES # BLD AUTO: 1.9 10*3/MM3 (ref 0.7–3.1)
LYMPHOCYTES NFR BLD AUTO: 11.7 % (ref 19.6–45.3)
MCH RBC QN AUTO: 24.6 PG (ref 26.6–33)
MCHC RBC AUTO-ENTMCNC: 30.6 G/DL (ref 31.5–35.7)
MCV RBC AUTO: 80.5 FL (ref 79–97)
MONOCYTES # BLD AUTO: 1.1 10*3/MM3 (ref 0.1–0.9)
MONOCYTES NFR BLD AUTO: 6.8 % (ref 5–12)
NEUTROPHILS NFR BLD AUTO: 13.04 10*3/MM3 (ref 1.7–7)
NEUTROPHILS NFR BLD AUTO: 80 % (ref 42.7–76)
NT-PROBNP SERPL-MCNC: 88.9 PG/ML (ref 0–900)
PLATELET # BLD AUTO: 390 10*3/MM3 (ref 140–450)
PMV BLD AUTO: 8.7 FL (ref 6–12)
POTASSIUM SERPL-SCNC: 4.4 MMOL/L (ref 3.5–5.2)
PROT SERPL-MCNC: 7.8 G/DL (ref 6–8.5)
QT INTERVAL: 309 MS
QTC INTERVAL: 419 MS
RBC # BLD AUTO: 5.69 10*6/MM3 (ref 4.14–5.8)
SODIUM SERPL-SCNC: 135 MMOL/L (ref 136–145)
TROPONIN T DELTA: 1 NG/L
TROPONIN T SERPL HS-MCNC: 9 NG/L
WBC NRBC COR # BLD AUTO: 16.29 10*3/MM3 (ref 3.4–10.8)

## 2024-04-19 PROCEDURE — 99285 EMERGENCY DEPT VISIT HI MDM: CPT

## 2024-04-19 PROCEDURE — 71045 X-RAY EXAM CHEST 1 VIEW: CPT

## 2024-04-19 PROCEDURE — 80053 COMPREHEN METABOLIC PANEL: CPT

## 2024-04-19 PROCEDURE — 93010 ELECTROCARDIOGRAM REPORT: CPT | Performed by: INTERNAL MEDICINE

## 2024-04-19 PROCEDURE — 83880 ASSAY OF NATRIURETIC PEPTIDE: CPT

## 2024-04-19 PROCEDURE — 87040 BLOOD CULTURE FOR BACTERIA: CPT

## 2024-04-19 PROCEDURE — 25810000003 SODIUM CHLORIDE 0.9 % SOLUTION: Performed by: EMERGENCY MEDICINE

## 2024-04-19 PROCEDURE — 96375 TX/PRO/DX INJ NEW DRUG ADDON: CPT

## 2024-04-19 PROCEDURE — 25810000003 SODIUM CHLORIDE 0.9 % SOLUTION

## 2024-04-19 PROCEDURE — 93005 ELECTROCARDIOGRAM TRACING: CPT | Performed by: EMERGENCY MEDICINE

## 2024-04-19 PROCEDURE — 96376 TX/PRO/DX INJ SAME DRUG ADON: CPT

## 2024-04-19 PROCEDURE — 85379 FIBRIN DEGRADATION QUANT: CPT

## 2024-04-19 PROCEDURE — 71275 CT ANGIOGRAPHY CHEST: CPT

## 2024-04-19 PROCEDURE — 96365 THER/PROPH/DIAG IV INF INIT: CPT

## 2024-04-19 PROCEDURE — 84484 ASSAY OF TROPONIN QUANT: CPT

## 2024-04-19 PROCEDURE — 74177 CT ABD & PELVIS W/CONTRAST: CPT

## 2024-04-19 PROCEDURE — 25010000002 CEFTRIAXONE PER 250 MG

## 2024-04-19 PROCEDURE — 36415 COLL VENOUS BLD VENIPUNCTURE: CPT

## 2024-04-19 PROCEDURE — 96361 HYDRATE IV INFUSION ADD-ON: CPT

## 2024-04-19 PROCEDURE — 99284 EMERGENCY DEPT VISIT MOD MDM: CPT

## 2024-04-19 PROCEDURE — 83605 ASSAY OF LACTIC ACID: CPT

## 2024-04-19 PROCEDURE — 25510000001 IOPAMIDOL PER 1 ML: Performed by: EMERGENCY MEDICINE

## 2024-04-19 PROCEDURE — 85025 COMPLETE CBC W/AUTO DIFF WBC: CPT

## 2024-04-19 PROCEDURE — 25010000002 HYDROMORPHONE PER 4 MG: Performed by: EMERGENCY MEDICINE

## 2024-04-19 RX ORDER — HYDROMORPHONE HYDROCHLORIDE 1 MG/ML
0.5 INJECTION, SOLUTION INTRAMUSCULAR; INTRAVENOUS; SUBCUTANEOUS ONCE
Status: COMPLETED | OUTPATIENT
Start: 2024-04-19 | End: 2024-04-19

## 2024-04-19 RX ORDER — SODIUM CHLORIDE 0.9 % (FLUSH) 0.9 %
10 SYRINGE (ML) INJECTION AS NEEDED
Status: DISCONTINUED | OUTPATIENT
Start: 2024-04-19 | End: 2024-04-20 | Stop reason: HOSPADM

## 2024-04-19 RX ORDER — SODIUM CHLORIDE 9 MG/ML
75 INJECTION, SOLUTION INTRAVENOUS CONTINUOUS
Status: DISCONTINUED | OUTPATIENT
Start: 2024-04-19 | End: 2024-04-20 | Stop reason: HOSPADM

## 2024-04-19 RX ORDER — IBUPROFEN 400 MG/1
800 TABLET ORAL ONCE
Status: COMPLETED | OUTPATIENT
Start: 2024-04-19 | End: 2024-04-19

## 2024-04-19 RX ORDER — ACETAMINOPHEN 500 MG
1000 TABLET ORAL ONCE
Status: COMPLETED | OUTPATIENT
Start: 2024-04-19 | End: 2024-04-19

## 2024-04-19 RX ORDER — ASPIRIN 325 MG
325 TABLET ORAL ONCE
Status: COMPLETED | OUTPATIENT
Start: 2024-04-19 | End: 2024-04-19

## 2024-04-19 RX ADMIN — CEFTRIAXONE SODIUM 1000 MG: 1 INJECTION, POWDER, FOR SOLUTION INTRAMUSCULAR; INTRAVENOUS at 17:21

## 2024-04-19 RX ADMIN — HYDROMORPHONE HYDROCHLORIDE 0.5 MG: 1 INJECTION, SOLUTION INTRAMUSCULAR; INTRAVENOUS; SUBCUTANEOUS at 16:59

## 2024-04-19 RX ADMIN — SODIUM CHLORIDE 1000 ML: 9 INJECTION, SOLUTION INTRAVENOUS at 17:30

## 2024-04-19 RX ADMIN — IBUPROFEN 800 MG: 400 TABLET, FILM COATED ORAL at 19:08

## 2024-04-19 RX ADMIN — ACETAMINOPHEN 1000 MG: 500 TABLET ORAL at 17:07

## 2024-04-19 RX ADMIN — IOPAMIDOL 100 ML: 755 INJECTION, SOLUTION INTRAVENOUS at 17:08

## 2024-04-19 RX ADMIN — SODIUM CHLORIDE 75 ML/HR: 9 INJECTION, SOLUTION INTRAVENOUS at 20:23

## 2024-04-19 RX ADMIN — HYDROMORPHONE HYDROCHLORIDE 0.5 MG: 1 INJECTION, SOLUTION INTRAMUSCULAR; INTRAVENOUS; SUBCUTANEOUS at 22:01

## 2024-04-19 RX ADMIN — ASPIRIN 325 MG: 325 TABLET ORAL at 16:11

## 2024-04-19 NOTE — ED NOTES
"\"Nursing report ED to floor  Ant Massey  58 y.o.  male    HPI :  HPI (Adult)  Stated Reason for Visit: CP x several hours,  History Obtained From: patient  Duration (Hours): 2    Chief Complaint  Chief Complaint   Patient presents with    Chest Pain     X several hours,        Admitting doctor:   No admitting provider for patient encounter.    Admitting diagnosis:   The primary encounter diagnosis was Pleural effusion. Diagnoses of Tachycardia and Hepatic abscess were also pertinent to this visit.    Code status:   Current Code Status       Date Active Code Status Order ID Comments User Context       Prior            Allergies:   Oxycodone    Isolation:   No active isolations    Intake and Output    Intake/Output Summary (Last 24 hours) at 4/19/2024 1912  Last data filed at 4/19/2024 1758  Gross per 24 hour   Intake 100 ml   Output --   Net 100 ml       Weight:       04/19/24  1555   Weight: (!) 159 kg (350 lb)       Most recent vitals:   Vitals:    04/19/24 1730 04/19/24 1756 04/19/24 1758 04/19/24 1847   BP: 144/76      BP Location:       Patient Position:       Pulse: 117 118     Resp:       Temp:   (!) 103.2 °F (39.6 °C) (!) 101.6 °F (38.7 °C)   TempSrc:   Oral Oral   SpO2: 95% 98%     Weight:       Height:           Active LDAs/IV Access:   Lines, Drains & Airways       Active LDAs       Name Placement date Placement time Site Days    Peripheral IV 04/19/24 1611 Left Antecubital 04/19/24  1611  Antecubital  less than 1                    Labs (abnormal labs have a star):   Labs Reviewed   COMPREHENSIVE METABOLIC PANEL - Abnormal; Notable for the following components:       Result Value    Glucose 115 (*)     Sodium 135 (*)     All other components within normal limits    Narrative:     GFR Normal >60  Chronic Kidney Disease <60  Kidney Failure <15     D-DIMER, QUANTITATIVE - Abnormal; Notable for the following components:    D-Dimer, Quantitative 0.58 (*)     All other components within normal limits    " "Narrative:     According to the assay 's published package insert, a normal (<0.50 MCGFEU/mL) D-dimer result in conjunction with a non-high clinical probability assessment, excludes deep vein thrombosis (DVT) and pulmonary embolism (PE) with high sensitivity.    D-dimer values increase with age and this can make VTE exclusion of an older population difficult. To address this, the [employer], based on best available evidence and recent guidelines, recommends that clinicians use age-adjusted D-dimer thresholds in patients greater than 50 years of age with: a) a low probability of PE who do not meet all Pulmonary Embolism Rule Out Criteria, or b) in those with intermediate probability of PE.   The formula for an age-adjusted D-dimer cut-off is \"age/100\".  For example, a 60 year old patient would have an age-adjusted cut-off of 0.60 MCGFEU/mL and an 80 year old 0.80 MCGFEU/mL.   CBC WITH AUTO DIFFERENTIAL - Abnormal; Notable for the following components:    WBC 16.29 (*)     MCH 24.6 (*)     MCHC 30.6 (*)     Neutrophil % 80.0 (*)     Lymphocyte % 11.7 (*)     Neutrophils, Absolute 13.04 (*)     Monocytes, Absolute 1.10 (*)     All other components within normal limits   BNP (IN-HOUSE) - Normal    Narrative:     This assay is used as an aid in the diagnosis of individuals suspected of having heart failure. It can be used as an aid in the diagnosis of acute decompensated heart failure (ADHF) in patients presenting with signs and symptoms of ADHF to the emergency department (ED). In addition, NT-proBNP of <300 pg/mL indicates ADHF is not likely.    Age Range Result Interpretation  NT-proBNP Concentration (pg/mL:      <50             Positive            >450                   Gray                 300-450                    Negative             <300    50-75           Positive            >900                  Gray                300-900                  Negative            <300      >75       "       Positive            >1800                  Gray                300-1800                  Negative            <300   TROPONIN - Normal    Narrative:     High Sensitive Troponin T Reference Range:  <14.0 ng/L- Negative Female for AMI  <22.0 ng/L- Negative Male for AMI  >=14 - Abnormal Female indicating possible myocardial injury.  >=22 - Abnormal Male indicating possible myocardial injury.   Clinicians would have to utilize clinical acumen, EKG, Troponin, and serial changes to determine if it is an Acute Myocardial Infarction or myocardial injury due to an underlying chronic condition.        HIGH SENSITIVITIY TROPONIN T 2HR - Normal    Narrative:     High Sensitive Troponin T Reference Range:  <14.0 ng/L- Negative Female for AMI  <22.0 ng/L- Negative Male for AMI  >=14 - Abnormal Female indicating possible myocardial injury.  >=22 - Abnormal Male indicating possible myocardial injury.   Clinicians would have to utilize clinical acumen, EKG, Troponin, and serial changes to determine if it is an Acute Myocardial Infarction or myocardial injury due to an underlying chronic condition.        LACTIC ACID, PLASMA - Normal   BLOOD CULTURE   BLOOD CULTURE   CBC AND DIFFERENTIAL    Narrative:     The following orders were created for panel order CBC & Differential.  Procedure                               Abnormality         Status                     ---------                               -----------         ------                     CBC Auto Differential[[id number]]        Abnormal            Final result                 Please view results for these tests on the individual orders.       EKG:   ECG 12 Lead Chest Pain   Final Result   HEART RATE= 110  bpm   RR Interval= 545  ms   MN Interval= 162  ms   P Horizontal Axis= 6  deg   P Front Axis= 50  deg   QRSD Interval= 101  ms   QT Interval= 309  ms   QTcB= 419  ms   QRS Axis= 53  deg   T Wave Axis= 45  deg   - OTHERWISE NORMAL ECG -   Sinus tachycardia - new    Electronically Signed By: Lesley Gan ([facility]) 19-Apr-2024 16:03:40   Date and Time of Study: 2024-04-19 15:55:59          Meds given in ED:   Medications   sodium chloride 0.9 % flush 10 mL (has no administration in time range)   aspirin tablet 325 mg (325 mg Oral Given 4/19/24 1611)   HYDROmorphone (DILAUDID) injection 0.5 mg (0.5 mg Intravenous Given 4/19/24 1659)   acetaminophen (TYLENOL) tablet 1,000 mg (1,000 mg Oral Given 4/19/24 1707)   cefTRIAXone (ROCEPHIN) 1,000 mg in sodium chloride 0.9 % 100 mL MBP (0 mg Intravenous Stopped 4/19/24 1758)   iopamidol (ISOVUE-370) 76 % injection 100 mL (100 mL Intravenous Given 4/19/24 1708)   sodium chloride 0.9 % bolus 1,000 mL (0 mL Intravenous Stopped 4/19/24 1800)   ibuprofen (ADVIL,MOTRIN) tablet 800 mg (800 mg Oral Given 4/19/24 1908)       Imaging results:  CT Angiogram Chest Pulmonary Embolism    Result Date: 4/19/2024    1. Inadequate evaluation of the pulmonary arteries, as described, repeat imaging could be attempted, VQ scan could be considered for further evaluation.  2. Mild to moderate right pleural effusion. Atelectasis/consolidation in the right lower lobe. Follow-up recommended.  3. At the hepatic dome, a subcapsular collection (hematoma or abscess) versus neoplasm. Irregular densities within the fat posterior inferior to the hepatic tip, lateral to the right kidney may be metastatic implants or possibly foci of hemorrhage. Compare with prior imaging, PET/CT correlation can be obtained as indicated. Apparent intramuscular hematoma (versus metastatic disease) in the right aspect of the abdomen.  4. Multiple ventral herniations of bowel. No bowel obstruction or focal acute inflammatory process of bowel identified. Follow-up as indications persist.  5. No obstructive uropathy.  This report was finalized on 4/19/2024 5:54 PM by Dr. Ed Cole M.D on Workstation: [employer]      CT Abdomen Pelvis With Contrast    Result Date: 4/19/2024    1.  Inadequate evaluation of the pulmonary arteries, as described, repeat imaging could be attempted, VQ scan could be considered for further evaluation.  2. Mild to moderate right pleural effusion. Atelectasis/consolidation in the right lower lobe. Follow-up recommended.  3. At the hepatic dome, a subcapsular collection (hematoma or abscess) versus neoplasm. Irregular densities within the fat posterior inferior to the hepatic tip, lateral to the right kidney may be metastatic implants or possibly foci of hemorrhage. Compare with prior imaging, PET/CT correlation can be obtained as indicated. Apparent intramuscular hematoma (versus metastatic disease) in the right aspect of the abdomen.  4. Multiple ventral herniations of bowel. No bowel obstruction or focal acute inflammatory process of bowel identified. Follow-up as indications persist.  5. No obstructive uropathy.  This report was finalized on 4/19/2024 5:54 PM by Dr. Ed Cole M.D on Workstation: [id number]       Ambulatory status:   - ad debbie    Social issues:   Social History     Socioeconomic History    Marital status:    Tobacco Use    Smoking status: Never   Vaping Use    Vaping status: Never Used   Substance and Sexual Activity    Alcohol use: Not Currently     Alcohol/week: 4.0 standard drinks of alcohol     Types: 4 Cans of beer per week    Drug use: No    Sexual activity: Defer       Peripheral Neurovascular  Peripheral Neurovascular (Adult)  Peripheral Neurovascular WDL: WDL    Neuro Cognitive  Neuro Cognitive (Adult)  Cognitive/Neuro/Behavioral WDL: WDL    Learning  Learning Assessment (Adult)  Learning Readiness and Ability: no barriers identified  Education Provided  Person Taught: patient, spouse  Teaching Method: verbal instruction  Teaching Focus: symptom/problem overview  Education Outcome Evaluation: eager to learn, acceptance expressed, able to teach back, verbalizes understanding    Respiratory  Respiratory WDL  Respiratory WDL: .WDL  except, all  Rhythm/Pattern, Respiratory: shortness of breath    Abdominal Pain       Pain Assessments  Pain (Adult)  (0-10) Pain Rating: Rest: 6  (0-10) Pain Rating: Activity: 8  Pain Side/Orientation: right  Pain Description: stabbing  Response to Pain Interventions: mobility function improved    NIH Stroke Scale       Morenita Carmona RN  04/19/24 19:12 EDT

## 2024-04-19 NOTE — FSED PROVIDER NOTE
"Subjective   History of Present Illness  Patient is a 58-year-old male with a history of colon cancer who presents to the emergency department for right-sided chest pain that onset suddenly about an hour and a half prior to arrival while driving.  Patient voices some associated shortness of breath.  Reports he is otherwise been feeling well this week.  Denies any cough, respiratory symptoms.  Patient does report recent history of significant abdominal surgeries.  Patient had cholecystectomy over a year ago, but recent abscess discovery of an multiple rounds of drainage.  Patient takes daily anxiety medication, but no other medications.  Denies history of diabetes, HTN, HLD, smoking.  Reports he is followed by PCP.        Review of Systems   Constitutional:  Positive for chills and fever. Negative for appetite change, diaphoresis and fatigue.   Respiratory:  Positive for shortness of breath. Negative for cough, wheezing and stridor.    Cardiovascular:  Positive for chest pain and leg swelling (Chronic).   Gastrointestinal:  Negative for abdominal pain, constipation, diarrhea, nausea and vomiting.   Genitourinary:  Negative for difficulty urinating and dysuria.   Skin:  Negative for color change, pallor, rash and wound.       Past Medical History:   Diagnosis Date    Anxiety     Colon cancer     Hearing loss     BELINDA    Knee pain, left     Sleep apnea     CPAP    Varicose vein of leg     BELINDA    Ventral hernia        Allergies   Allergen Reactions    Oxycodone Rash     Pt states he is not allergic to oxycodone; \"it just makes me itch\"         Past Surgical History:   Procedure Laterality Date    ACHILLES TENDON SURGERY Right 2015    COLON RESECTION      COLONOSCOPY      ERCP      GALLBLADDER SURGERY      KNEE MENISCAL REPAIR Right     TOTAL KNEE ARTHROPLASTY Left 2/14/2023    Procedure: LEFT TOTAL KNEE ARTHROPLASTY;  Surgeon: Nhan Beard MD;  Location: [facility] [place];  Service: Orthopedics;  Laterality: Left; "       Family History   Problem Relation Age of Onset    Malig Hyperthermia Neg Hx        Social History     Socioeconomic History    Marital status:    Tobacco Use    Smoking status: Never   Vaping Use    Vaping status: Never Used   Substance and Sexual Activity    Alcohol use: Not Currently     Alcohol/week: 4.0 standard drinks of alcohol     Types: 4 Cans of beer per week    Drug use: No    Sexual activity: Defer           Objective   Physical Exam  Constitutional:       General: He is not in acute distress.     Appearance: He is obese. He is not ill-appearing, toxic-appearing or diaphoretic.      Comments: Chills.  Shivering.   Cardiovascular:      Rate and Rhythm: Regular rhythm. Tachycardia present.   Pulmonary:      Effort: No respiratory distress.      Comments: Occasional cough.  Decreased lung sounds.  Obscured by body habitus.  Abdominal:      General: There is distension.      Tenderness: There is no abdominal tenderness. There is no guarding or rebound.      Hernia: A hernia is present.      Comments: Ventral hernia.  Abdomen soft, nonsurgical.  Nontender.   Musculoskeletal:         General: Normal range of motion.      Cervical back: Normal range of motion.      Comments: BLE nonpitting chronic edema unchanged today.   Skin:     General: Skin is warm and dry.      Capillary Refill: Capillary refill takes less than 2 seconds.   Neurological:      General: No focal deficit present.      Mental Status: He is alert.   Psychiatric:         Behavior: Behavior normal.         Procedures           ED Course  ED Course as of 04/19/24 2213   Fri Apr 19, 2024   1610 CBC with elevated white count and left shift.  Normal hemoglobin and hematocrit.  CMP largely unremarkable.  Mildly elevated dimer.  Normal troponin and BNP. [AS]   1630 STAT PORTABLE RADIOGRAPHIC VIEW OF THE CHEST     CLINICAL HISTORY: Chest pain.     FINDINGS:     Stat portable radiographic view of the chest demonstrates an  enlarged  cardiomediastinal silhouette that does appear larger in size when  compared to the previous exam dated 1/31/2023. Additionally, there is  airspace opacification within the right lung base which could represent  atelectatic change or pneumonia. Please correlate with clinical data.  The left lung is clear.   [AS]   1700 Patient spiked 103.4F fever.  Afebrile upon arrival.  Tylenol given.  Initiating Rocephin.  Lactic and blood cultures drawn. O2 dropping after narcotic therapy. Wearing NC oxygen now. [AS]   1725 Patient feeling improved after Dilaudid and Tylenol. [AS]   1726 Sepsis screen.  Initiating fluids. Patient remains tachycardic. [AS]   1758 CT abdomen pelvis with contrast, and CTA chest PE:    IMPRESSION:        1. Inadequate evaluation of the pulmonary arteries, as described, repeat  imaging could be attempted, VQ scan could be considered for further  evaluation.     2. Mild to moderate right pleural effusion. Atelectasis/consolidation in  the right lower lobe. Follow-up recommended.     3. At the hepatic dome, a subcapsular collection (hematoma or abscess)  versus neoplasm. Irregular densities within the fat posterior inferior  to the hepatic tip, lateral to the right kidney may be metastatic  implants or possibly foci of hemorrhage. Compare with prior imaging,  PET/CT correlation can be obtained as indicated. Apparent intramuscular  hematoma (versus metastatic disease) in the right aspect of the abdomen.     4. Multiple ventral herniations of bowel. No bowel obstruction or focal  acute inflammatory process of bowel identified. Follow-up as indications  persist.     5. No obstructive uropathy.   [AS]   1813 Patient typically followed by [facility].  Recent surgical team with [facility].  Surgeon Dr. Nahn Giraldo. [AS]   1825 Brenda Kuhn NP accepts patient for [facility]. [AS]   1900 Normal lactic.  Initial and repeat troponin normal. [AS]   1901 Fever gradually coming down.   Additional ibuprofen given. [AS]   2022 Patient's tachycardia significantly improved.  Down to 103.  Oxygen low at 90% on 2 L nasal cannula. [AS]   2211 Vital signs WNL prior to transfer. [AS]      ED Course User Index  [AS] Keyanna Darden, FELIZ                                           Medical Decision Making  Patient is a 58-year-old male who presents for right-sided chest pain that onset acutely.  CBC does reveal an elevated white count, and CMP unremarkable.  Elevated dimer, but subsequent CT chest is somewhat limited for PE due to large right effusion.  PE thought unlikely.  Normal lactic.  Troponins are normal.  Normal BNP.  Effusion not because of CHF or fluid overload.  Patient does spike a fever while in ER.  Very likely infectious etiology.  Antipyretics and antibiotics initiated.  Sepsis screen.  Fluids initiated.  Does well on all these medications.  Pain control with Dilaudid.  Patient also has recent history of multiple surgeries for abdominal abscesses in the RUQ following cholecystectomy.  Patient patient and spouse would prefer [facility] admission, as that is where his surgery team is.  I am agreeable.  I spoke to the [facility] access and transfer center.  [facility] is typically where the patient goes, but they have no beds available.  They will accept the patient at [facility].   Patient and spouse agreeable to admission and transfer.  All questions answered.  Patient's vital signs stable and WNL prior to transfer.    My differential diagnosis for chest pain includes but is not limited to:  Muscle strain, costochondritis, myositis, pleurisy, rib fracture, intercostal neuritis, herpes zoster, tumor, myocardial infarction, coronary syndrome, unstable angina, angina, aortic dissection, mitral valve prolapse, pericarditis, palpitations, pulmonary embolus, pneumonia, pneumothorax, lung cancer, GERD, esophagitis, esophageal spasm     Problems Addressed:  Hepatic abscess: complicated acute  illness or injury  Pleural effusion: complicated acute illness or injury  Tachycardia: complicated acute illness or injury    Amount and/or Complexity of Data Reviewed  Labs: ordered.  Radiology: ordered.  ECG/medicine tests: ordered.    Risk  OTC drugs.  Prescription drug management.        Final diagnoses:   Pleural effusion   Tachycardia   Hepatic abscess   Pneumonia of right lung due to infectious organism, unspecified part of lung       ED Disposition  ED Disposition       ED Disposition   Transfer to Another Facility     Condition   --    Comment   --               No follow-up provider specified.       Medication List      No changes were made to your prescriptions during this visit.

## 2024-04-19 NOTE — ED NOTES
Keyanna MEHTA spoke with [facility] who states that patient will be transferred to [facility]. Pending a bed assignment.

## 2024-04-24 LAB
BACTERIA SPEC AEROBE CULT: NORMAL
BACTERIA SPEC AEROBE CULT: NORMAL

## (undated) DEVICE — CONTAINER,SPECIMEN,OR STERILE,4OZ: Brand: MEDLINE

## (undated) DEVICE — DUAL CUT SAGITTAL BLADE

## (undated) DEVICE — PAD,ABDOMINAL,8"X10",ST,LF: Brand: MEDLINE

## (undated) DEVICE — POOLE SUCTION HANDLE: Brand: CARDINAL HEALTH

## (undated) DEVICE — DRAPE,REIN 53X77,STERILE: Brand: MEDLINE

## (undated) DEVICE — BNDG ELAS ELITE V/CLOSE 4IN 5YD LF STRL

## (undated) DEVICE — PK KN TOTL 40

## (undated) DEVICE — ANTIBACTERIAL UNDYED BRAIDED (POLYGLACTIN 910), SYNTHETIC ABSORBABLE SUTURE: Brand: COATED VICRYL

## (undated) DEVICE — APPL CHLORAPREP HI/LITE 26ML ORNG

## (undated) DEVICE — GLV SURG PREMIERPRO ORTHO LTX PF SZ7.5 BRN

## (undated) DEVICE — DRSNG GZ PETROLTM XEROFORM CURAD 1X8IN STRL

## (undated) DEVICE — UNDERCAST PADDING: Brand: DEROYAL

## (undated) DEVICE — STPLR SKIN VISISTAT WD 35CT

## (undated) DEVICE — KT DRN EVAC WND PVC PCH WTROC RND 10F400

## (undated) DEVICE — PENCL ES MEGADINE EZ/CLEAN BUTN W/HOLSTR 10FT

## (undated) DEVICE — NEEDLE, QUINCKE, 20GX3.5": Brand: MEDLINE

## (undated) DEVICE — DECANTER BAG 9": Brand: MEDLINE INDUSTRIES, INC.

## (undated) DEVICE — GLV SURG BIOGEL LTX PF 7 1/2

## (undated) DEVICE — SYRINGE, LUER LOCK, 60ML: Brand: MEDLINE